# Patient Record
Sex: FEMALE | Race: WHITE | Employment: PART TIME | URBAN - METROPOLITAN AREA
[De-identification: names, ages, dates, MRNs, and addresses within clinical notes are randomized per-mention and may not be internally consistent; named-entity substitution may affect disease eponyms.]

---

## 2018-03-15 ENCOUNTER — HOSPITAL ENCOUNTER (OUTPATIENT)
Dept: MRI IMAGING | Age: 68
Discharge: HOME | End: 2018-03-17
Payer: MEDICARE

## 2018-03-15 ENCOUNTER — HOSPITAL ENCOUNTER (OUTPATIENT)
Age: 68
Discharge: HOME | End: 2018-03-15
Payer: MEDICARE

## 2018-03-15 DIAGNOSIS — R52 PAIN: ICD-10-CM

## 2018-03-15 DIAGNOSIS — M54.12 RADICULOPATHY OF CERVICAL SPINE: ICD-10-CM

## 2018-03-15 LAB
BUN BLDV-MCNC: 22 MG/DL (ref 8–23)
CREAT SERPL-MCNC: 0.9 MG/DL (ref 0.5–1)
GFR AFRICAN AMERICAN: >60
GFR NON-AFRICAN AMERICAN: >60 ML/MIN/1.73

## 2018-03-15 PROCEDURE — 6360000004 HC RX CONTRAST MEDICATION: Performed by: RADIOLOGY

## 2018-03-15 PROCEDURE — 72156 MRI NECK SPINE W/O & W/DYE: CPT

## 2018-03-15 PROCEDURE — 84520 ASSAY OF UREA NITROGEN: CPT

## 2018-03-15 PROCEDURE — 36415 COLL VENOUS BLD VENIPUNCTURE: CPT

## 2018-03-15 PROCEDURE — 82565 ASSAY OF CREATININE: CPT

## 2018-03-15 PROCEDURE — A9579 GAD-BASE MR CONTRAST NOS,1ML: HCPCS | Performed by: RADIOLOGY

## 2018-03-15 RX ADMIN — GADOTERIDOL 16 ML: 279.3 INJECTION, SOLUTION INTRAVENOUS at 15:42

## 2018-04-30 ENCOUNTER — OFFICE VISIT (OUTPATIENT)
Dept: NEUROSURGERY | Age: 68
End: 2018-04-30
Payer: MEDICARE

## 2018-04-30 VITALS
WEIGHT: 180 LBS | SYSTOLIC BLOOD PRESSURE: 139 MMHG | HEART RATE: 87 BPM | HEIGHT: 64 IN | DIASTOLIC BLOOD PRESSURE: 86 MMHG | BODY MASS INDEX: 30.73 KG/M2

## 2018-04-30 DIAGNOSIS — M54.2 NECK PAIN: Primary | ICD-10-CM

## 2018-04-30 PROCEDURE — 3017F COLORECTAL CA SCREEN DOC REV: CPT | Performed by: NEUROLOGICAL SURGERY

## 2018-04-30 PROCEDURE — G8427 DOCREV CUR MEDS BY ELIG CLIN: HCPCS | Performed by: NEUROLOGICAL SURGERY

## 2018-04-30 PROCEDURE — 99204 OFFICE O/P NEW MOD 45 MIN: CPT | Performed by: NEUROLOGICAL SURGERY

## 2018-04-30 PROCEDURE — 1090F PRES/ABSN URINE INCON ASSESS: CPT | Performed by: NEUROLOGICAL SURGERY

## 2018-04-30 PROCEDURE — G8417 CALC BMI ABV UP PARAM F/U: HCPCS | Performed by: NEUROLOGICAL SURGERY

## 2018-04-30 RX ORDER — DICYCLOMINE HYDROCHLORIDE 10 MG/1
10 CAPSULE ORAL
COMMUNITY

## 2018-04-30 RX ORDER — GRAPE SEED EXT/BIOFLAV,CITRUS 50MG-250MG
CAPSULE ORAL
COMMUNITY
End: 2021-02-09 | Stop reason: ALTCHOICE

## 2018-04-30 ASSESSMENT — ENCOUNTER SYMPTOMS
NAUSEA: 0
PHOTOPHOBIA: 0
COUGH: 0
SHORTNESS OF BREATH: 0
BLURRED VISION: 0
VOMITING: 0
STRIDOR: 0

## 2018-06-07 ENCOUNTER — HOSPITAL ENCOUNTER (OUTPATIENT)
Age: 68
Discharge: HOME | End: 2018-06-07
Payer: MEDICARE

## 2018-06-07 DIAGNOSIS — M46.22 OSTEOMYELITIS OF VERTEBRA OF CERVICAL REGION (HCC): ICD-10-CM

## 2018-06-07 LAB
C-REACTIVE PROTEIN: 0.2 MG/DL (ref 0–0.4)
SEDIMENTATION RATE, ERYTHROCYTE: 10 MM/HR (ref 0–20)

## 2018-06-07 PROCEDURE — 86140 C-REACTIVE PROTEIN: CPT

## 2018-06-07 PROCEDURE — 36415 COLL VENOUS BLD VENIPUNCTURE: CPT

## 2018-06-07 PROCEDURE — 87040 BLOOD CULTURE FOR BACTERIA: CPT

## 2018-06-07 PROCEDURE — 85651 RBC SED RATE NONAUTOMATED: CPT

## 2018-06-12 LAB — BLOOD CULTURE, ROUTINE: NORMAL

## 2018-08-21 ENCOUNTER — OFFICE VISIT (OUTPATIENT)
Dept: CARDIOLOGY CLINIC | Age: 68
End: 2018-08-21
Payer: MEDICARE

## 2018-08-21 VITALS
RESPIRATION RATE: 18 BRPM | BODY MASS INDEX: 31.67 KG/M2 | WEIGHT: 190.1 LBS | HEART RATE: 85 BPM | DIASTOLIC BLOOD PRESSURE: 88 MMHG | HEIGHT: 65 IN | SYSTOLIC BLOOD PRESSURE: 132 MMHG

## 2018-08-21 DIAGNOSIS — I10 ESSENTIAL HYPERTENSION: ICD-10-CM

## 2018-08-21 DIAGNOSIS — I25.10 CORONARY ARTERY DISEASE INVOLVING NATIVE CORONARY ARTERY OF NATIVE HEART WITHOUT ANGINA PECTORIS: Primary | ICD-10-CM

## 2018-08-21 DIAGNOSIS — E78.2 MIXED HYPERLIPIDEMIA: ICD-10-CM

## 2018-08-21 DIAGNOSIS — I38 VALVULAR HEART DISEASE: ICD-10-CM

## 2018-08-21 PROCEDURE — 1101F PT FALLS ASSESS-DOCD LE1/YR: CPT | Performed by: INTERNAL MEDICINE

## 2018-08-21 PROCEDURE — 1036F TOBACCO NON-USER: CPT | Performed by: INTERNAL MEDICINE

## 2018-08-21 PROCEDURE — G8417 CALC BMI ABV UP PARAM F/U: HCPCS | Performed by: INTERNAL MEDICINE

## 2018-08-21 PROCEDURE — G8400 PT W/DXA NO RESULTS DOC: HCPCS | Performed by: INTERNAL MEDICINE

## 2018-08-21 PROCEDURE — 4040F PNEUMOC VAC/ADMIN/RCVD: CPT | Performed by: INTERNAL MEDICINE

## 2018-08-21 PROCEDURE — 1123F ACP DISCUSS/DSCN MKR DOCD: CPT | Performed by: INTERNAL MEDICINE

## 2018-08-21 PROCEDURE — 1090F PRES/ABSN URINE INCON ASSESS: CPT | Performed by: INTERNAL MEDICINE

## 2018-08-21 PROCEDURE — G8598 ASA/ANTIPLAT THER USED: HCPCS | Performed by: INTERNAL MEDICINE

## 2018-08-21 PROCEDURE — G8427 DOCREV CUR MEDS BY ELIG CLIN: HCPCS | Performed by: INTERNAL MEDICINE

## 2018-08-21 PROCEDURE — 93000 ELECTROCARDIOGRAM COMPLETE: CPT | Performed by: INTERNAL MEDICINE

## 2018-08-21 PROCEDURE — 99213 OFFICE O/P EST LOW 20 MIN: CPT | Performed by: INTERNAL MEDICINE

## 2018-08-21 PROCEDURE — 3017F COLORECTAL CA SCREEN DOC REV: CPT | Performed by: INTERNAL MEDICINE

## 2018-08-21 NOTE — PROGRESS NOTES
Al Hyd-Mg Hyd]        Current Medications:  Current Outpatient Prescriptions   Medication Sig Dispense Refill    LYRICA 100 MG capsule TK ONE C PO  BID  1    Black Cohosh 540 MG CAPS Take by mouth      Omega-3 Fatty Acids (FISH OIL) 1200 MG CPDR Take by mouth      dicyclomine (BENTYL) 10 MG capsule Take 10 mg by mouth 4 times daily (before meals and nightly)      magnesium oxide (MAG-OX) 400 MG tablet Take 400 mg by mouth daily      venlafaxine (EFFEXOR) 75 MG tablet Take 75 mg by mouth daily       dexlansoprazole (DEXILANT) 60 MG CPDR delayed release capsule Take 60 mg by mouth daily      cetirizine (ZYRTEC) 10 MG tablet Take 10 mg by mouth daily      fluticasone (FLONASE) 50 MCG/ACT nasal spray 1 spray by Nasal route daily      ZETIA 10 MG tablet   3    clopidogrel (PLAVIX) 75 MG tablet Take 75 mg by mouth daily.  lisinopril (PRINIVIL;ZESTRIL) 10 MG tablet Take 10 mg by mouth daily.  hydrocortisone 2.5 % cream Apply  topically 2 times daily. Apply topically 2 times daily.  albuterol (PROAIR HFA) 108 (90 BASE) MCG/ACT inhaler Inhale 2 puffs into the lungs every 6 hours as needed for Wheezing.  zolpidem (AMBIEN) 10 MG tablet Take 10 mg by mouth nightly as needed for Sleep.  ALPRAZolam (XANAX) 0.5 MG tablet Take 0.5 mg by mouth 2 times daily as needed for Sleep or Anxiety.  montelukast (SINGULAIR) 10 MG tablet Take 10 mg by mouth nightly.  Coenzyme Q10 (CO Q-10) 200 MG CAPS Take by mouth      methocarbamol (ROBAXIN) 500 MG tablet Take 500 mg by mouth 4 times daily      budesonide-formoterol (SYMBICORT) 160-4.5 MCG/ACT AERO Inhale 2 puffs into the lungs 2 times daily      acyclovir (ZOVIRAX) 5 % CREA Apply topically 3 times daily      estradiol 0.025 MG/24HR PTTW Place 1 patch onto the skin Twice a Week.  clobetasol (TEMOVATE) 0.05 % cream Apply  topically 2 times daily. Apply topically 2 times daily.       nystatin (MYCOSTATIN) 369794 UNIT/GM cream Apply topically 2 times daily. Apply topically 2 times daily.  diclofenac (FLECTOR) 1.3 % patch Place 1 patch onto the skin 2 times daily. No current facility-administered medications for this visit. Physical Exam:  /88   Pulse 85   Resp 18   Ht 5' 5\" (1.651 m)   Wt 190 lb 1.6 oz (86.2 kg)   BMI 31.63 kg/m²   Wt Readings from Last 3 Encounters:   08/21/18 190 lb 1.6 oz (86.2 kg)   06/06/18 184 lb (83.5 kg)   04/30/18 180 lb (81.6 kg)     Appearance: Awake, alert and oriented x 3, no acute respiratory distress  Skin: Intact, no erythema, no rash  Head: Normocephalic, atraumatic  Eyes: EOMI, no conjunctival erythema  ENMT: No pharyngeal erythema, MMM, no rhinorrhea, no dentures  Neck: Supple, no elevated JVP, no carotid bruits  Lungs: Clear to auscultation bilaterally. No wheezes, rales, or rhonchi. Cardiac: Regular rate and rhythm, +S1S2, no murmurs apparent  Abdomen: Soft, nontender, non-distended, +bowel sounds  Extremities: Moves all extremities x 4, no lower extremity edema  Neurologic: No focal motor deficits apparent, normal mood and affect, alert and oriented x 3    Laboratory Tests:  Lab Results   Component Value Date    CREATININE 0.9 03/15/2018    BUN 22 03/15/2018     09/19/2014    K 3.9 09/19/2014     (H) 09/19/2014    CO2 19 (L) 09/19/2014     Lab Results   Component Value Date    WBC 4.0 09/19/2014    RBC 2.84 09/19/2014    HGB 9.1 09/19/2014    HCT 26.8 09/19/2014    MCV 94.4 09/19/2014    MCH 32.1 09/19/2014    MCHC 34.0 09/19/2014    RDW 12.8 09/19/2014     09/19/2014    MPV 8.0 09/19/2014     Lab Results   Component Value Date    MG 2.1 09/19/2014     Cardiac Tests:  ECG: SR, rate 85, NSSTT changes    ASSESSMENT / PLAN:  1. CAD s/p 5V CABG in 2001 with subsequent PCI ~ 6 months later. Negative stress test in 2007.  Small mild reversible defect at the basal inferior wall on 3/2014 stress test (medically managed, normal LV function with EF 60% on echocardiogram at that time). Previously followed by Dr. Camila Riojas. No new cardiac complaints. 2. HTN - typically controlled, most recent 's-130's  3. HLD - on zetia, reported statin intolerance (multiple statins)  4. Chronic back pain  5.  Mild MR and mild TR on 3/2014 echocardiogram  - 2/2015 labs: K 4.3, Cr 0.87, normal LFT's, chol 202, trig 70, LDL 92, HDL 86  - 5/2017 labs: K 4.4, Cr 0.87, normal LFT's, chol 292, trig 232, , HDL 60    - Continue current medications (including plavix, zetia, and lisinopril)  - Follow-up results of repeat lipid panel (recently performed) / discussed option of repatha (not covered per patient)    Monika Dallas MD  [facility] [place] ([facility]) Cardiology

## 2019-02-14 ENCOUNTER — HOSPITAL ENCOUNTER (OUTPATIENT)
Age: 69
Discharge: HOME | End: 2019-02-14
Payer: MEDICARE

## 2019-02-14 ENCOUNTER — HOSPITAL ENCOUNTER (OUTPATIENT)
Dept: CT IMAGING | Age: 69
Discharge: HOME | End: 2019-02-16
Payer: MEDICARE

## 2019-02-14 ENCOUNTER — HOSPITAL ENCOUNTER (OUTPATIENT)
Age: 69
Discharge: HOME | End: 2019-02-16
Payer: MEDICARE

## 2019-02-14 DIAGNOSIS — R10.84 ABDOMINAL PAIN, GENERALIZED: ICD-10-CM

## 2019-02-14 DIAGNOSIS — R10.32 ABDOMINAL PAIN, LEFT LOWER QUADRANT: ICD-10-CM

## 2019-02-14 LAB
ALBUMIN SERPL-MCNC: 4.5 G/DL (ref 3.5–5.2)
ALP BLD-CCNC: 181 U/L (ref 35–104)
ALT SERPL-CCNC: 83 U/L (ref 0–32)
AMYLASE: 49 U/L (ref 20–100)
ANION GAP SERPL CALCULATED.3IONS-SCNC: 15 MMOL/L (ref 7–16)
AST SERPL-CCNC: 64 U/L (ref 0–31)
BACTERIA: ABNORMAL /HPF
BASOPHILS ABSOLUTE: 0.04 E9/L (ref 0–0.2)
BASOPHILS RELATIVE PERCENT: 0.4 % (ref 0–2)
BILIRUB SERPL-MCNC: 0.5 MG/DL (ref 0–1.2)
BILIRUBIN URINE: NEGATIVE
BLOOD, URINE: ABNORMAL
BUN BLDV-MCNC: 10 MG/DL (ref 8–23)
CALCIUM SERPL-MCNC: 10.4 MG/DL (ref 8.6–10.2)
CHLORIDE BLD-SCNC: 99 MMOL/L (ref 98–107)
CLARITY: CLEAR
CO2: 26 MMOL/L (ref 22–29)
COLOR: YELLOW
CREAT SERPL-MCNC: 1 MG/DL (ref 0.5–1)
EOSINOPHILS ABSOLUTE: 0.16 E9/L (ref 0.05–0.5)
EOSINOPHILS RELATIVE PERCENT: 1.6 % (ref 0–6)
GFR AFRICAN AMERICAN: >60
GFR NON-AFRICAN AMERICAN: 55 ML/MIN/1.73
GLUCOSE BLD-MCNC: 103 MG/DL (ref 74–99)
GLUCOSE URINE: NEGATIVE MG/DL
HCT VFR BLD CALC: 40.6 % (ref 34–48)
HEMOGLOBIN: 13 G/DL (ref 11.5–15.5)
IMMATURE GRANULOCYTES #: 0.06 E9/L
IMMATURE GRANULOCYTES %: 0.6 % (ref 0–5)
KETONES, URINE: NEGATIVE MG/DL
LEUKOCYTE ESTERASE, URINE: NEGATIVE
LIPASE: 55 U/L (ref 13–60)
LYMPHOCYTES ABSOLUTE: 1.72 E9/L (ref 1.5–4)
LYMPHOCYTES RELATIVE PERCENT: 17.6 % (ref 20–42)
MCH RBC QN AUTO: 31.4 PG (ref 26–35)
MCHC RBC AUTO-ENTMCNC: 32 % (ref 32–34.5)
MCV RBC AUTO: 98.1 FL (ref 80–99.9)
MONOCYTES ABSOLUTE: 0.67 E9/L (ref 0.1–0.95)
MONOCYTES RELATIVE PERCENT: 6.8 % (ref 2–12)
NEUTROPHILS ABSOLUTE: 7.14 E9/L (ref 1.8–7.3)
NEUTROPHILS RELATIVE PERCENT: 73 % (ref 43–80)
NITRITE, URINE: NEGATIVE
PDW BLD-RTO: 12.7 FL (ref 11.5–15)
PH UA: 6 (ref 5–9)
PLATELET # BLD: 318 E9/L (ref 130–450)
PMV BLD AUTO: 8.9 FL (ref 7–12)
POTASSIUM SERPL-SCNC: 4 MMOL/L (ref 3.5–5)
PROTEIN UA: NEGATIVE MG/DL
RBC # BLD: 4.14 E12/L (ref 3.5–5.5)
RBC UA: ABNORMAL /HPF (ref 0–2)
SODIUM BLD-SCNC: 140 MMOL/L (ref 132–146)
SPECIFIC GRAVITY UA: <=1.005 (ref 1–1.03)
TOTAL PROTEIN: 7.9 G/DL (ref 6.4–8.3)
UROBILINOGEN, URINE: 0.2 E.U./DL
WBC # BLD: 9.8 E9/L (ref 4.5–11.5)
WBC UA: ABNORMAL /HPF (ref 0–5)

## 2019-02-14 PROCEDURE — 6360000004 HC RX CONTRAST MEDICATION: Performed by: PHYSICIAN ASSISTANT

## 2019-02-14 PROCEDURE — 2580000003 HC RX 258: Performed by: NURSE PRACTITIONER

## 2019-02-14 PROCEDURE — 82150 ASSAY OF AMYLASE: CPT

## 2019-02-14 PROCEDURE — 81001 URINALYSIS AUTO W/SCOPE: CPT

## 2019-02-14 PROCEDURE — 87088 URINE BACTERIA CULTURE: CPT

## 2019-02-14 PROCEDURE — 36415 COLL VENOUS BLD VENIPUNCTURE: CPT

## 2019-02-14 PROCEDURE — 83690 ASSAY OF LIPASE: CPT

## 2019-02-14 PROCEDURE — 80053 COMPREHEN METABOLIC PANEL: CPT

## 2019-02-14 PROCEDURE — 85025 COMPLETE CBC W/AUTO DIFF WBC: CPT

## 2019-02-14 PROCEDURE — 74177 CT ABD & PELVIS W/CONTRAST: CPT

## 2019-02-14 RX ORDER — SODIUM CHLORIDE 0.9 % (FLUSH) 0.9 %
10 SYRINGE (ML) INJECTION PRN
Status: DISCONTINUED | OUTPATIENT
Start: 2019-02-14 | End: 2019-02-17 | Stop reason: HOSPADM

## 2019-02-14 RX ADMIN — IOHEXOL 50 ML: 240 INJECTION, SOLUTION INTRATHECAL; INTRAVASCULAR; INTRAVENOUS; ORAL at 15:14

## 2019-02-14 RX ADMIN — IOPAMIDOL 100 ML: 755 INJECTION, SOLUTION INTRAVENOUS at 15:14

## 2019-02-14 RX ADMIN — Medication 10 ML: at 15:14

## 2019-02-15 LAB — URINE CULTURE, ROUTINE: NORMAL

## 2019-04-10 ENCOUNTER — HOSPITAL ENCOUNTER (OUTPATIENT)
Age: 69
Discharge: HOME | End: 2019-04-10
Payer: MEDICARE

## 2019-04-10 LAB
ALBUMIN SERPL-MCNC: 4.3 G/DL (ref 3.5–5.2)
ALP BLD-CCNC: 103 U/L (ref 35–104)
ALT SERPL-CCNC: 31 U/L (ref 0–32)
AMYLASE: 42 U/L (ref 20–100)
ANION GAP SERPL CALCULATED.3IONS-SCNC: 13 MMOL/L (ref 7–16)
AST SERPL-CCNC: 30 U/L (ref 0–31)
BASOPHILS ABSOLUTE: 0.02 E9/L (ref 0–0.2)
BASOPHILS RELATIVE PERCENT: 0.3 % (ref 0–2)
BILIRUB SERPL-MCNC: 0.4 MG/DL (ref 0–1.2)
BUN BLDV-MCNC: 10 MG/DL (ref 8–23)
CALCIUM SERPL-MCNC: 9.6 MG/DL (ref 8.6–10.2)
CHLORIDE BLD-SCNC: 101 MMOL/L (ref 98–107)
CO2: 26 MMOL/L (ref 22–29)
CREAT SERPL-MCNC: 1 MG/DL (ref 0.5–1)
EOSINOPHILS ABSOLUTE: 0.17 E9/L (ref 0.05–0.5)
EOSINOPHILS RELATIVE PERCENT: 2.6 % (ref 0–6)
GFR AFRICAN AMERICAN: >60
GFR NON-AFRICAN AMERICAN: 55 ML/MIN/1.73
GLUCOSE BLD-MCNC: 98 MG/DL (ref 74–99)
HCT VFR BLD CALC: 41.9 % (ref 34–48)
HEMOGLOBIN: 13.6 G/DL (ref 11.5–15.5)
IMMATURE GRANULOCYTES #: 0.02 E9/L
IMMATURE GRANULOCYTES %: 0.3 % (ref 0–5)
LYMPHOCYTES ABSOLUTE: 1.9 E9/L (ref 1.5–4)
LYMPHOCYTES RELATIVE PERCENT: 29.5 % (ref 20–42)
MCH RBC QN AUTO: 31.1 PG (ref 26–35)
MCHC RBC AUTO-ENTMCNC: 32.5 % (ref 32–34.5)
MCV RBC AUTO: 95.7 FL (ref 80–99.9)
MONOCYTES ABSOLUTE: 0.5 E9/L (ref 0.1–0.95)
MONOCYTES RELATIVE PERCENT: 7.8 % (ref 2–12)
NEUTROPHILS ABSOLUTE: 3.84 E9/L (ref 1.8–7.3)
NEUTROPHILS RELATIVE PERCENT: 59.5 % (ref 43–80)
PDW BLD-RTO: 13 FL (ref 11.5–15)
PLATELET # BLD: 281 E9/L (ref 130–450)
PMV BLD AUTO: 9.3 FL (ref 7–12)
POTASSIUM SERPL-SCNC: 3.9 MMOL/L (ref 3.5–5)
RBC # BLD: 4.38 E12/L (ref 3.5–5.5)
SODIUM BLD-SCNC: 140 MMOL/L (ref 132–146)
TOTAL PROTEIN: 7.3 G/DL (ref 6.4–8.3)
WBC # BLD: 6.5 E9/L (ref 4.5–11.5)

## 2019-04-10 PROCEDURE — 80053 COMPREHEN METABOLIC PANEL: CPT

## 2019-04-10 PROCEDURE — 36415 COLL VENOUS BLD VENIPUNCTURE: CPT

## 2019-04-10 PROCEDURE — 82150 ASSAY OF AMYLASE: CPT

## 2019-04-10 PROCEDURE — 85025 COMPLETE CBC W/AUTO DIFF WBC: CPT

## 2019-04-10 PROCEDURE — 80061 LIPID PANEL: CPT

## 2019-04-11 ENCOUNTER — HOSPITAL ENCOUNTER (OUTPATIENT)
Dept: CT IMAGING | Age: 69
Discharge: HOME | End: 2019-04-13
Payer: MEDICARE

## 2019-04-11 DIAGNOSIS — R10.30 LOWER ABDOMINAL PAIN: ICD-10-CM

## 2019-04-11 LAB
CHOLESTEROL, TOTAL: 245 MG/DL (ref 0–199)
HDLC SERPL-MCNC: 70 MG/DL
LDL CHOLESTEROL CALCULATED: 147 MG/DL (ref 0–99)
TRIGL SERPL-MCNC: 141 MG/DL (ref 0–149)
VLDLC SERPL CALC-MCNC: 28 MG/DL

## 2019-04-11 PROCEDURE — 2580000003 HC RX 258: Performed by: INTERNAL MEDICINE

## 2019-04-11 PROCEDURE — 74177 CT ABD & PELVIS W/CONTRAST: CPT

## 2019-04-11 PROCEDURE — 6360000004 HC RX CONTRAST MEDICATION: Performed by: RADIOLOGY

## 2019-04-11 RX ORDER — SODIUM CHLORIDE 0.9 % (FLUSH) 0.9 %
10 SYRINGE (ML) INJECTION PRN
Status: DISCONTINUED | OUTPATIENT
Start: 2019-04-11 | End: 2019-04-14 | Stop reason: HOSPADM

## 2019-04-11 RX ADMIN — Medication 10 ML: at 09:07

## 2019-04-11 RX ADMIN — IOPAMIDOL 100 ML: 755 INJECTION, SOLUTION INTRAVENOUS at 09:06

## 2019-04-16 ENCOUNTER — HOSPITAL ENCOUNTER (OUTPATIENT)
Dept: PHYSICAL THERAPY | Age: 69
Setting detail: THERAPIES SERIES
Discharge: HOME | End: 2019-04-16
Payer: MEDICARE

## 2019-04-16 PROCEDURE — 97112 NEUROMUSCULAR REEDUCATION: CPT

## 2019-04-16 PROCEDURE — 97161 PT EVAL LOW COMPLEX 20 MIN: CPT

## 2019-04-16 PROCEDURE — 97140 MANUAL THERAPY 1/> REGIONS: CPT

## 2019-04-16 NOTE — PROGRESS NOTES
[address] [facility]                Phone: [phone]   Fax: [phone]    Physical Therapy Initial Evaluation  [facility]) Physical Therapy Initial Evaluation    Date of Eval: 2019   Today's Date: 2019  Patient Name: Stanley Daniel  MRN: [id number]  : 1950     Current Visit Number: 1 of 8 visits per current plan of care  Therapist Name: Murali Shah, PT, DPT  Insurance Type: medicate  ICD-10/Diagnosis: muscle weakness  Precautions: hysterectomy and oophrectomy in her 35s   Referring Provider: Dr. Elizabeth Quick of Care:   Certification Date: 45-    SUBJECTIVE:  Pt is a(n) 68 y.o. y/o female presenting with a chief complaint of TERRA. Also has diverticulitis currently being treated.  Has had for about 1 year, here to learn exercises to avoid problem getting worse    Symptom Duration: 1 year  Previous Treatment/Testing/Surgeries:  Hysterectomy and oophrectomy >15 years ago  Relevant Medical History/LBP:  Arthritic LBP  Medications: plavix, abx for diverticulitis, ptomac for stomach, zetia (diuretic  Work Status: works 2 days per week at  and [employer] for children      Functional Limitations: worried about leaking, pad use, cant lift children without leaking/fearing leaking    Pain: no pain issues      Pregnancy:   Number of pregnancies: 4  Number of births: 3  Type of delivery: natural  Pelvic trauma, tearing, pain during/post pregnancy: episiotomy, 10.4# baby   Prolonged labor: no  Menopausal/Pre-menopausal: post--surgical menopause      Bladder:   Daytime voiding frequency: every 20 minutes -- 2-3 hours   Nighttime voiding frequency: 0-1x  Urinary urgency/leaking with urge: no  Leaking with cough/sneeze/laugh/exercise: yes  Amount of incontinence: small to medium amounts   Pain/burning with urination: no    Pain with full bladder: no  Difficulty starting stream/hesitancy: no  Straining: no  Sensation of incomplete bladder emptying: no  Post Pelvic Floor Connective Tissue/Muscle Assessment: assessed externally and vaginally   Introitus: normal, some scarring noted ~6oclock   Bulbospongiosis: min tihgt nil tender   Ischiocavernosus: ok    Periurethrals: normal  Perineal body: hypermobile  Pubococcygeus: nil tight and tender   Iliococcygeus: ok  Coccygeus: ok  Obturator Internus: min tight and tender B    Rectal:  External Anal Sphincter: NT  Puborectalis: NT     Pelvic Floor Internal Strength Assessment:   Power: 3/5   Endurance: 3s  Repetitions: 8 (MVC with 4 seconds rest between each contraction until fatigue)     TREATMENT:     TREATMENT INTERVENTIONS:   Manual Therapy (41753): Internal and/or external connective tissue and muscular tightness/restrictions identified above were treated with skin rolling, trigger point release, reciprocal inhibition, myofascial release, and/or contract/relax as appropriate. Neuromuscular Re-Education (67758): Pt was educated via verbal and tactile feedback on appropriate abdominal, diaphragm, and pelvic floor neuromuscular coordination and mobility necessary to address chief complaint. Pt was educated and instructed in appropriate diaphragmatic breathing   Pt was educated on pelvic floor relaxation and pelvic/body awareness   Functional progression- TA/PF with exhale, FFF, HAHA, [facility]  Patient Education:   Pt educated re: anatomy and physiology of chief complaint   Water intake  Bladder irritants/Sandwiching irritants   Bladder diary  Squatty potty/elevation of lower extremities during elimination   Defecation dynamics  Lubricant use and recommendations    HEP: bladder diary, PF/TA co-contractions, PF/TA functional progression    Post treatment pain/symptoms: non    Patient verbalized understanding of education: yes  ? PLAN OF CARE:   Assessment: Pt presents with >1 year TERRA.  Pt demonstrates decreased coordination with core and IAP, increased pad usage, PF laxity paired with some minimal restrictions in mm/CT, and potentially suboptimal fluid intake spacing which are likely contributing to patients chief complaint and causing decreased QOL. . This patient may benefit from skilled therapy services to address these impairments and limitations and meet pt and PT goals and improve pts quality of life. Prognosis is good    Next visit: assess bladder diary      Goals for Episode of Care: created on 4/16/19  To be achieved in 8 visits   Pt will verbalize an understanding of the anatomy and physiology of the problem as well as causes of incontinence related to the pt's presentation. Pt will demonstrate appropriate isolation of PFM including appropriate contraction and relaxation. Pt will be independent in and compliant with performance of a home exercise program provided by therapist and perform as instructed. Pt will report/demonstrate a reduction of incontinence/leakage episodes by at least 50%. Pt will report decreased pad usage by at least 50%. Pt will demonstrate use of functional pelvic floor muscle contraction by performing a pre-contraction (KNACK) to reduce/eliminate stress incontinence during cough/sneeze/laugh/lifting/jumping, etc.   Pt will report decreased urinary frequency/increased time between voids to every 2-5 hours. ?  Planned Interventions, Frequency, and Duration: 1 visit every 2-3 weeks for 2 visits then reassess. ? Patient demonstrates good understanding of plan of care and treatment. The above goals and plan of care were discussed and agreed upon by patient/family. Billing:   Time in: 1302  Time out: 1400  Total Time: 58 minutes     Dheeraj Reardon, [address] [place] [address], [facility]  License [id number]     Katy Villegas  T: [phone]   F: [phone]     If you have any questions or concerns, please don't hesitate to call.   Thank you for your referral.    Physician Signature:________________________________Date:__________________  By signing above, therapists plan is approved by physician. All patients under [employer] must be signed by physician.

## 2019-04-25 ENCOUNTER — HOSPITAL ENCOUNTER (OUTPATIENT)
Dept: PHYSICAL THERAPY | Age: 69
Setting detail: THERAPIES SERIES
Discharge: HOME | End: 2019-04-25
Payer: MEDICARE

## 2019-04-25 PROCEDURE — 97112 NEUROMUSCULAR REEDUCATION: CPT

## 2019-04-25 NOTE — PROGRESS NOTES
[address] [facility]                Phone: [phone]   Fax: [phone]    Physical Therapy Treatment Note  [facility] ([facility]) Physical Therapy    Date of Eval: 2019   Today's Date: 2019  Patient Name: Kassandra Cherry  MRN: [id number]  : 1950     Current Visit Number: 2 of 8 visits per current plan of care  Therapist Name: Zunilda Daly, PT, DPT  Insurance Type: medicare   ICD-10/Diagnosis: muscle weakness   Precautions: hysterectomy and oophrectomy in her 35s   Referring Provider: Dr. Farida Werner of Care:   Certification Date: 2914--3/49/8908  ? Patient identified by name and birth date: Yes    SUBJECTIVE: did homework, did knack, overall better. Less leaks.      Pain: no pain issues      Pregnancy: ()  Number of pregnancies: 4  Number of births: 3  Type of delivery: natural  Pelvic trauma, tearing, pain during/post pregnancy: episiotomy, 10.4# baby   Prolonged labor: no  Menopausal/Pre-menopausal: post--surgical menopause      Bladder: ()  Daytime voiding frequency: every 20 minutes -- 2-3 hours   Nighttime voiding frequency: 0-1x  Urinary urgency/leaking with urge: no  Leaking with cough/sneeze/laugh/exercise: yes  Amount of incontinence: small to medium amounts   Pain/burning with urination: no    Pain with full bladder: no  Difficulty starting stream/hesitancy: no  Straining: no  Sensation of incomplete bladder emptying: no  Post void dribble:  nono  Falling out sensation or bulging: no  Pad usage: 2 liners  History of UTIs: no  Fluid intake: 3L of water, 3 cups of coffee in AM      Bowel: ()  Frequency:  2-3x per day   Type: 6-7 lately  Fecal urgency/Leaking with urge: no   Leaking with cough/sneeze/laugh/exercise: no  Able to control gas: yes   Leaking with passing gas: no  Seepage after having bowel movement: no  Pain or burning with defecation: no  Straining to start/finish emptying: no   Sensation of incomplete bowel emptying: yes  Manual techniques for emptying:  no  Falling out sensation or bulging: no  Supplemental fiber/probiotic use/laxatives/softeners/enema: laxative and stool softeners due to diverticulitis   Time spent on toilet: 5 minutes     Sexual Function: ()  Pain with penetration, pelvic examination: no  Use of lubricants/contraception: no   Pain with menstruation/cyclical pain: no  Pain with orgasm: no   Difficulty achieving orgasm: no  History of sexual abuse, body shaming, pelvic trauma:  No    OBJECTIVE MEASURES WITH LEVEL OF FUNCTION:  Sitting posture: good in sitting   Bladder diary:   # voids: 8-11  Time between voids: 30 min-6hours--30 min intervals in clusters of voids lasting 13-22s, 3-4x   Seconds voidins-25s      TREATMENT INTERVENTIONS:   Neuromuscular Re-Education (38927): Pt was educated on anatomy/physiology of chief complaint  Pt was educated and instructed in appropriate diaphragmatic breathing   Pt was educated on pelvic floor relaxation and pelvic/body awareness   Pt was educated on urgency and urge suppression strategies   Pt was educated on appropriate defecation/elimination dynamics to encourage normal and safe bowel/bladder emptying techniques   Patient Education:   Water intake  Bladder irritants/Sandwiching irritants   Fiber intake   Fiber/food diary   Squatty potty/elevation of lower extremities during elimination     HEP: bladder diary, PF/TA co-contractions, PF/TA functional progression  New today: urgency suppression, voiding habits/bladder emptying, fluid intake and spacing *    ? Post treatment pain/symptoms: none     Patient verbalized understanding of education: yes   ? ASSESSMENT: Progressing through HEP--doing fairly diligently. Noticing less leaks and dryer pads. Added in urgency suppression, timing voids, minimizing JIC voids, and increasing awareness of bladder / fluid intake schedule.  Current TE is still appropriately challenging--none new added today    Next visit: progress TE Billing:   Time in: 1215  Time out: 1245  Total Time: 30 minutes     Stacia Womack, DPT  License [id number]

## 2019-08-13 ENCOUNTER — HOSPITAL ENCOUNTER (OUTPATIENT)
Dept: PHYSICAL THERAPY | Age: 69
Setting detail: THERAPIES SERIES
Discharge: HOME | End: 2019-08-13
Payer: MEDICARE

## 2019-08-29 ENCOUNTER — OFFICE VISIT (OUTPATIENT)
Dept: CARDIOLOGY CLINIC | Age: 69
End: 2019-08-29
Payer: MEDICARE

## 2019-08-29 VITALS
HEIGHT: 65 IN | HEART RATE: 93 BPM | DIASTOLIC BLOOD PRESSURE: 88 MMHG | WEIGHT: 170.2 LBS | RESPIRATION RATE: 16 BRPM | BODY MASS INDEX: 28.36 KG/M2 | SYSTOLIC BLOOD PRESSURE: 130 MMHG

## 2019-08-29 DIAGNOSIS — I25.10 CORONARY ARTERY DISEASE INVOLVING NATIVE CORONARY ARTERY OF NATIVE HEART WITHOUT ANGINA PECTORIS: Primary | ICD-10-CM

## 2019-08-29 DIAGNOSIS — E78.2 MIXED HYPERLIPIDEMIA: ICD-10-CM

## 2019-08-29 DIAGNOSIS — I38 VALVULAR HEART DISEASE: ICD-10-CM

## 2019-08-29 DIAGNOSIS — I10 ESSENTIAL HYPERTENSION: ICD-10-CM

## 2019-08-29 DIAGNOSIS — R07.2 PRECORDIAL CHEST PAIN: ICD-10-CM

## 2019-08-29 PROCEDURE — G8419 CALC BMI OUT NRM PARAM NOF/U: HCPCS | Performed by: INTERNAL MEDICINE

## 2019-08-29 PROCEDURE — G8400 PT W/DXA NO RESULTS DOC: HCPCS | Performed by: INTERNAL MEDICINE

## 2019-08-29 PROCEDURE — 1123F ACP DISCUSS/DSCN MKR DOCD: CPT | Performed by: INTERNAL MEDICINE

## 2019-08-29 PROCEDURE — G8598 ASA/ANTIPLAT THER USED: HCPCS | Performed by: INTERNAL MEDICINE

## 2019-08-29 PROCEDURE — 3017F COLORECTAL CA SCREEN DOC REV: CPT | Performed by: INTERNAL MEDICINE

## 2019-08-29 PROCEDURE — 93000 ELECTROCARDIOGRAM COMPLETE: CPT | Performed by: INTERNAL MEDICINE

## 2019-08-29 PROCEDURE — 4040F PNEUMOC VAC/ADMIN/RCVD: CPT | Performed by: INTERNAL MEDICINE

## 2019-08-29 PROCEDURE — 1036F TOBACCO NON-USER: CPT | Performed by: INTERNAL MEDICINE

## 2019-08-29 PROCEDURE — G8427 DOCREV CUR MEDS BY ELIG CLIN: HCPCS | Performed by: INTERNAL MEDICINE

## 2019-08-29 PROCEDURE — 1090F PRES/ABSN URINE INCON ASSESS: CPT | Performed by: INTERNAL MEDICINE

## 2019-08-29 PROCEDURE — 99214 OFFICE O/P EST MOD 30 MIN: CPT | Performed by: INTERNAL MEDICINE

## 2019-08-29 RX ORDER — AMOXICILLIN 250 MG
1 CAPSULE ORAL DAILY
COMMUNITY
End: 2021-02-09 | Stop reason: ALTCHOICE

## 2019-08-29 RX ORDER — PANTOPRAZOLE SODIUM 40 MG/1
40 TABLET, DELAYED RELEASE ORAL DAILY
COMMUNITY

## 2019-08-29 RX ORDER — RANITIDINE 150 MG/1
150 TABLET ORAL 2 TIMES DAILY
COMMUNITY
End: 2021-02-09 | Stop reason: ALTCHOICE

## 2019-08-29 NOTE — PROGRESS NOTES
OFFICE FOLLOW-UP    Name: Radha Meeks     Age: 68 y.o. Primary Care Physician: Madalyn Langley DO    Date of Service: 8/29/2019     Chief Complaint: Follow-up for CAD    Interim History:  She denies exertional chest pain, worsening SOB, palpitations, syncope, PND, or orthopnea. +fatigue and intermittent left arm pain (occasionally with exertion) since last admission. Currently with no active cardiac complaints at rest. SR on EKG. Review of Systems:   Cardiac: As per HPI  General: No fever, chills  Pulmonary: As per HPI  HEENT: No visual disturbances, difficult swallowing  GI: No nausea, vomiting, abdominal pain  Endocrine: No thyroid disease or diabetes  Musculoskeletal: BOYLE x 4, +chronic back pain  Skin: Intact, no rashes  Neuro/Psych: No headache, dementia, seizures, or focal motor deficits    Past Medical History:  Past Medical History:   Diagnosis Date    Asthma     CAD (coronary artery disease)     Hypertension      Past Surgical History:  Past Surgical History:   Procedure Laterality Date    APPENDECTOMY      BREAST SURGERY      CORONARY ARTERY BYPASS GRAFT      HYSTERECTOMY      KNEE SURGERY       Family History:  Non-contributory    Social History:  Social History     Socioeconomic History    Marital status:      Spouse name: Not on file    Number of children: Not on file    Years of education: Not on file    Highest education level: Not on file   Occupational History    Not on file   Social Needs    Financial resource strain: Not on file    Food insecurity:     Worry: Not on file     Inability: Not on file    Transportation needs:     Medical: Not on file     Non-medical: Not on file   Tobacco Use    Smoking status: Never Smoker    Smokeless tobacco: Never Used   Substance and Sexual Activity    Alcohol use:  Yes     Alcohol/week: 3.0 standard drinks     Types: 3 Glasses of wine per week    Drug use: No    Sexual activity: Never   Lifestyle    Physical activity: tablet Take 10 mg by mouth nightly as needed for Sleep.  montelukast (SINGULAIR) 10 MG tablet Take 10 mg by mouth nightly.  LYRICA 100 MG capsule TK ONE C PO  BID  1    Black Cohosh 540 MG CAPS Take by mouth      dicyclomine (BENTYL) 10 MG capsule Take 10 mg by mouth 4 times daily (before meals and nightly)      Coenzyme Q10 (CO Q-10) 200 MG CAPS Take by mouth      magnesium oxide (MAG-OX) 400 MG tablet Take 400 mg by mouth daily      dexlansoprazole (DEXILANT) 60 MG CPDR delayed release capsule Take 60 mg by mouth daily      methocarbamol (ROBAXIN) 500 MG tablet Take 500 mg by mouth 4 times daily      acyclovir (ZOVIRAX) 5 % CREA Apply topically 3 times daily      fluticasone (FLONASE) 50 MCG/ACT nasal spray 1 spray by Nasal route daily      estradiol 0.025 MG/24HR PTTW Place 1 patch onto the skin Twice a Week.  clobetasol (TEMOVATE) 0.05 % cream Apply  topically 2 times daily. Apply topically 2 times daily.  nystatin (MYCOSTATIN) [id number] UNIT/GM cream Apply  topically 2 times daily. Apply topically 2 times daily.  hydrocortisone 2.5 % cream Apply  topically 2 times daily. Apply topically 2 times daily.  diclofenac (FLECTOR) 1.3 % patch Place 1 patch onto the skin 2 times daily.  ALPRAZolam (XANAX) 0.5 MG tablet Take 0.5 mg by mouth 2 times daily as needed for Sleep or Anxiety. No current facility-administered medications for this visit.       Physical Exam:  /88   Pulse 93   Resp 16   Ht 5' 5\" (1.651 m)   Wt 170 lb 3.2 oz (77.2 kg)   BMI 28.32 kg/m²   Wt Readings from Last 3 Encounters:   08/29/19 170 lb 3.2 oz (77.2 kg)   08/21/18 190 lb 1.6 oz (86.2 kg)   06/06/18 184 lb (83.5 kg)     Appearance: Awake, alert and oriented x 3, no acute respiratory distress  Skin: Intact, no erythema, no rash  Head: Normocephalic, atraumatic  Eyes: EOMI, no conjunctival erythema  ENMT: No pharyngeal erythema, MMM, no rhinorrhea, no dentures  Neck: Supple, no elevated JVP, trig 232, , HDL 60  - 4/2019 labs: chol 245, trig 141, , HDL 70    - Exercise nuclear stress test ordered today  - Continue current medications (including plavix, zetia, and lisinopril)  - 4/2019 labs reviewed / discussed option of repatha (not covered per patient)    Antoine Dunlap MD  [facility] ([facility]) Cardiology

## 2019-09-10 ENCOUNTER — TELEPHONE (OUTPATIENT)
Dept: CARDIOLOGY | Age: 69
End: 2019-09-10

## 2019-09-12 ENCOUNTER — HOSPITAL ENCOUNTER (OUTPATIENT)
Dept: CARDIOLOGY | Age: 69
Discharge: HOME | End: 2019-09-12
Payer: MEDICARE

## 2019-09-12 VITALS
WEIGHT: 170 LBS | HEART RATE: 88 BPM | SYSTOLIC BLOOD PRESSURE: 140 MMHG | DIASTOLIC BLOOD PRESSURE: 82 MMHG | HEIGHT: 64 IN | BODY MASS INDEX: 29.02 KG/M2

## 2019-09-12 DIAGNOSIS — I25.10 CORONARY ARTERY DISEASE INVOLVING NATIVE CORONARY ARTERY OF NATIVE HEART WITHOUT ANGINA PECTORIS: ICD-10-CM

## 2019-09-12 DIAGNOSIS — R07.2 PRECORDIAL CHEST PAIN: ICD-10-CM

## 2019-09-12 LAB
LV EF: 91 %
LVEF MODALITY: NORMAL

## 2019-09-12 PROCEDURE — 2580000003 HC RX 258: Performed by: INTERNAL MEDICINE

## 2019-09-12 PROCEDURE — A9500 TC99M SESTAMIBI: HCPCS | Performed by: INTERNAL MEDICINE

## 2019-09-12 PROCEDURE — 93017 CV STRESS TEST TRACING ONLY: CPT

## 2019-09-12 PROCEDURE — 3430000000 HC RX DIAGNOSTIC RADIOPHARMACEUTICAL: Performed by: INTERNAL MEDICINE

## 2019-09-12 PROCEDURE — 78452 HT MUSCLE IMAGE SPECT MULT: CPT

## 2019-09-12 RX ORDER — SODIUM CHLORIDE 0.9 % (FLUSH) 0.9 %
10 SYRINGE (ML) INJECTION PRN
Status: DISCONTINUED | OUTPATIENT
Start: 2019-09-12 | End: 2019-09-13 | Stop reason: HOSPADM

## 2019-09-12 RX ADMIN — Medication 10 ML: at 08:49

## 2019-09-12 RX ADMIN — Medication 30.5 MILLICURIE: at 08:49

## 2019-09-12 RX ADMIN — Medication 10 ML: at 07:14

## 2019-09-12 RX ADMIN — Medication 9.7 MILLICURIE: at 07:14

## 2019-09-12 NOTE — PROCEDURES
[address] [facility] [address]  [phone]                Exercise Stress Nuclear Gated SPECT Study    Name: Stephany [facility] Account Number: [de-identified]    :  1950      Sex: female              Date of Study:  2019    Height: 5' 4\" (162.6 cm)  Weight: 170 lb (77.1 kg)     Ordering Provider: Magui Starks MD          PCP: Nkechi Schwarz DO      Cardiologist: Magui Starks MD                        Interpreting Physician: Orlando Mathias DO  _________________________________________________________________________________    Indication:   Evaluation of extent and severity of coronary artery disease    Clinical History:   Patient has prior history of coronary artery disease. Resting ECG:    Normal sinus rhythm    Exercise: The patient exercised using a Jefry protocol, completing 6:30 minutes and reaching an estimated work load of 7.0 metabolic equivalents (METS). Resting HR was 76. Peak exercise heart rate was 130 ( 87% of maximum predicted heart rate for age). Baseline /78. Peak exercise /100. The blood pressure response to exercise was hypertensive      Exercise was terminated due to heart rate attained, dyspnea. The patient experienced no chest pain with exercise. Exercise ECG:   The patient demonstrated occasional PVC's during exercise, and recovery. With exercise, there were no ST segment changes of significance at the heart rate achieved. Tracey treadmill score was 6.5 implying low risk. IMAGING: Myocardial perfusion imaging was performed at rest 30-35 minutes following the intravenous injection of 9.7 mCi of (Tc-Sestamibi) followed by 10 ml of Normal Saline. At peak exercise, the patient was injected intravenously with 30.5 mCi of (Tc-Sestamibi) followed by 10 ml of Normal Saline.   Gated post-stress tomographic imaging was performed 20-25 minutes after

## 2019-09-16 ENCOUNTER — TELEPHONE (OUTPATIENT)
Dept: CARDIOLOGY CLINIC | Age: 69
End: 2019-09-16

## 2020-05-22 ENCOUNTER — HOSPITAL ENCOUNTER (OUTPATIENT)
Age: 70
Discharge: HOME | End: 2020-05-24

## 2020-05-22 PROCEDURE — 88304 TISSUE EXAM BY PATHOLOGIST: CPT

## 2020-05-22 PROCEDURE — 88311 DECALCIFY TISSUE: CPT

## 2021-02-09 ENCOUNTER — OFFICE VISIT (OUTPATIENT)
Dept: ENT CLINIC | Age: 71
End: 2021-02-09
Payer: MEDICARE

## 2021-02-09 ENCOUNTER — TELEPHONE (OUTPATIENT)
Dept: ENT CLINIC | Age: 71
End: 2021-02-09

## 2021-02-09 ENCOUNTER — PROCEDURE VISIT (OUTPATIENT)
Dept: AUDIOLOGY | Age: 71
End: 2021-02-09
Payer: MEDICARE

## 2021-02-09 VITALS — HEIGHT: 65 IN | BODY MASS INDEX: 28.82 KG/M2 | WEIGHT: 173 LBS

## 2021-02-09 DIAGNOSIS — H91.8X3 OTHER SPECIFIED HEARING LOSS OF BOTH EARS: Primary | ICD-10-CM

## 2021-02-09 DIAGNOSIS — J30.89 SEASONAL ALLERGIC RHINITIS DUE TO OTHER ALLERGIC TRIGGER: ICD-10-CM

## 2021-02-09 DIAGNOSIS — H69.83 DYSFUNCTION OF BOTH EUSTACHIAN TUBES: ICD-10-CM

## 2021-02-09 DIAGNOSIS — J32.4 CHRONIC PANSINUSITIS: ICD-10-CM

## 2021-02-09 PROCEDURE — 92567 TYMPANOMETRY: CPT | Performed by: AUDIOLOGIST

## 2021-02-09 PROCEDURE — 99205 OFFICE O/P NEW HI 60 MIN: CPT | Performed by: OTOLARYNGOLOGY

## 2021-02-09 PROCEDURE — 92557 COMPREHENSIVE HEARING TEST: CPT | Performed by: AUDIOLOGIST

## 2021-02-09 RX ORDER — DOCUSATE SODIUM 100 MG/1
100 CAPSULE, LIQUID FILLED ORAL 2 TIMES DAILY
COMMUNITY

## 2021-02-09 RX ORDER — VENLAFAXINE HYDROCHLORIDE 75 MG/1
CAPSULE, EXTENDED RELEASE ORAL
COMMUNITY
Start: 2021-01-31

## 2021-02-09 RX ORDER — AZELASTINE 1 MG/ML
2 SPRAY, METERED NASAL 2 TIMES DAILY
Qty: 4 BOTTLE | Refills: 1 | Status: SHIPPED | OUTPATIENT
Start: 2021-02-09

## 2021-02-09 RX ORDER — FAMOTIDINE 20 MG/1
20 TABLET, FILM COATED ORAL 2 TIMES DAILY
COMMUNITY

## 2021-02-09 NOTE — TELEPHONE ENCOUNTER
Called patient with information on facility address and phone number .  Also gave scheduling number [phone] pt due to work schedule wants to schedule herself

## 2021-02-09 NOTE — PROGRESS NOTES
DEPARTMENT OF OTOLARYNGOLOGY   HISTORY AND PHYSICAL      PATIENT: Estefania Kaminski : 1950 (72 y.o.)   DATE: 2021  REFERRED BY: Sacha Turner DO  [address]      HISTORY OBTAINED FROM:  patient    CHIEF COMPLAINT:  had concerns including Sinus Problem (having trouble hearing; pcp thinks sinus related; get weekly immunotherapy for years; doesnt seem to be helping; alotof drainage). HISTORY OF PRESENT ILLNESS:                                                                                        Estefania Kaminski is a(n) 65 y.o. female without a significant past medical history presents to the office today as a new patient for evaluation of her hearing loss and allergic rhinitis and sinus infection. Had sinus surgery about 35 years ago. Has PND, globus sensation, facial pressure, \"swishing in the head\". Has about 4 episodes of sinus infection per year that require abx. Treated with Singulair, Zyrtec, Flonase w/o much improvement. She did not use Flonase daily. No imaging has been done recent. Currently on immunotherapy for 20 years. Also complains of hearing loss for several years. Talking loudly, has to have TV on loud volume to hear. Her children thinks she has trouble hearing. Has trouble hearing especially in crowded environment. Denies noise exposure. Denies room spinning vertigo. Denies tinnitus except for the \"swishing in the head. \"     Past Medical History:   Diagnosis Date    Asthma     CAD (coronary artery disease)     History of blood transfusion     Hyperlipidemia     Hypertension         Past Surgical History:   Procedure Laterality Date    APPENDECTOMY      BREAST SURGERY      COLONOSCOPY      CORONARY ARTERY BYPASS GRAFT      HYSTERECTOMY      KNEE SURGERY           Current Outpatient Medications:     venlafaxine (EFFEXOR XR) 75 MG extended release capsule, TAKE ONE CAPSULE BY MOUTH EVERY DAY, Disp: , Rfl:     famotidine (PEPCID) 20 MG tablet, Take 20 mg by mouth 2 times daily, Disp: , Rfl:     docusate sodium (COLACE) 100 MG capsule, Take 100 mg by mouth 2 times daily, Disp: , Rfl:     pantoprazole (PROTONIX) 40 MG tablet, Take 40 mg by mouth daily, Disp: , Rfl:     Omega-3 Fatty Acids (FISH OIL) 1200 MG CPDR, Take by mouth, Disp: , Rfl:     dicyclomine (BENTYL) 10 MG capsule, Take 10 mg by mouth 4 times daily (before meals and nightly), Disp: , Rfl:     cetirizine (ZYRTEC) 10 MG tablet, Take 10 mg by mouth daily, Disp: , Rfl:     ZETIA 10 MG tablet, , Disp: , Rfl: 3    clopidogrel (PLAVIX) 75 MG tablet, Take 75 mg by mouth daily. , Disp: , Rfl:     lisinopril (PRINIVIL;ZESTRIL) 10 MG tablet, Take 10 mg by mouth daily. , Disp: , Rfl:     albuterol (PROAIR HFA) 108 (90 BASE) MCG/ACT inhaler, Inhale 2 puffs into the lungs every 6 hours as needed for Wheezing., Disp: , Rfl:     zolpidem (AMBIEN) 10 MG tablet, Take 10 mg by mouth nightly as needed for Sleep., Disp: , Rfl:     montelukast (SINGULAIR) 10 MG tablet, Take 10 mg by mouth nightly., Disp: , Rfl:     Allergies   Allergen Reactions    Amoxicillin-Pot Clavulanate     Biaxin [Clarithromycin]     Keflex [Cephalexin]     Levofloxacin     Lipitor     Lovastatin     Macrodantin [Nitrofurantoin Macrocrystal]     Sulfa Antibiotics     Asa Arthritis Strength-Antacid [Aspirin Buff, Al Hyd-Mg Hyd]        Family History   Adopted: Yes   Problem Relation Age of Onset    Arthritis Mother        Social History     Socioeconomic History    Marital status:      Spouse name: Not on file    Number of children: Not on file    Years of education: Not on file    Highest education level: Not on file   Occupational History    Not on file   Social Needs    Financial resource strain: Not on file    Food insecurity     Worry: Not on file     Inability: Not on file    Transportation needs     Medical: Not on file     Non-medical: Not on file   Tobacco Use    Smoking status: Never Smoker    Smokeless tobacco: Never Used   Substance and Sexual Activity    Alcohol use: Yes     Alcohol/week: 3.0 standard drinks     Types: 3 Glasses of wine per week    Drug use: No    Sexual activity: Never   Lifestyle    Physical activity     Days per week: Not on file     Minutes per session: Not on file    Stress: Not on file   Relationships    Social connections     Talks on phone: Not on file     Gets together: Not on file     Attends [employer] service: Not on file     Active member of club or organization: Not on file     Attends meetings of clubs or organizations: Not on file     Relationship status: Not on file    Intimate partner violence     Fear of current or ex partner: Not on file     Emotionally abused: Not on file     Physically abused: Not on file     Forced sexual activity: Not on file   Other Topics Concern    Not on file   Social History Narrative    Not on file       REVIEW OF SYSTEMS:  Review of Systems  Constitutional: Negative for chills and fever. Eyes: Negative for discharge and itching. ENT: Negative for congestion, ear discharge, ear pain, hearing loss and sore throat. Respiratory: Negative for chest tightness and shortness of breath. Cardiovascular: Negative for chestpain and palpitations. Gastrointestinal: Negative for abdominal distention and abdominal pain. Endocrinology: Negative for heat and cold intolerance. Neurological: Negative for focal weaknessor seizure   Skin: Negative for rash and itchiness   Psychiatric: Negative for depression and hallucinations     PHYSICAL EXAM:                                                                                                                Ht 5' 4.5\" (1.638 m)   Wt 173 lb (78.5 kg)   BMI 29.24 kg/m²   Physical Exam  Constitutional: Appears well-developed and well-nourished. Appears as stated age.    Eyes: Lids and lashes normal, pupils equal, extra ocular muscles intact, sclera clear   ENT: Sinuses nontender on palpation, external ears and ear canals without lesions, left tympanic membrane tympanic membrane intact without effusion or masses, right tympanic membrane tympanic membrane intact without effusion or masses, nasal septal midline, bilateral inferior turbinates boggy, minimal rhinorrhea, lips normal, good dentition, gums normal, oral pharynx with moist mucus membranes, normal voice   Neck:  Supple, symmetrical, trachea midline, no adenopathy, thyroid symmetric, not enlarged and no tenderness, skin normal   Respiratory: No increased work of breathing. No stridor or wheezes   Cardiovascular: Regular rate   Skin: Warm and dry   Neurologic:  Alert and oriented, CN II-XII grossly intact     ASSESSMENTAND PLAN:                                                                                                  Diagnosis Orders   1. Chronic pansinusitis  CT SINUS WO CONTRAST   2. Seasonal allergic rhinitis due to other allergic trigger  CT SINUS WO CONTRAST   3. Other specified hearing loss of both ears  W[place] COMPREHENSIVE HEARING TEST       · Obtain CT sinus  · Hearing test reviewed with patient, has bilateral moderate-severe SNHL. Patient warranted to have hearing aids if desire   · Astelin prescribed  · Educated patient to use Flonase daily for maximal therapeutic benefit   · Follow up in 1 month(s) or sooner if symptoms acutely exacerbate    Patient was seen and examined with attending physician, who agrees with the assessment and plans. Marek Stockton DO  Resident Physician  [facility]  Otolaryngology Residency  2/9/2021  9:14 AM          Braulio Real  1950      I have discussed the case, including pertinent history and exam findings with the resident. I have seen and examined the patient and the key elements of the encounter have been performed by me. I agree with the assessment, plan and orders as documented by the resident. Patient here for follow up of medical problems. Remainder of medical problems as per resident note.       [facility], DO  3/3/21

## 2021-02-09 NOTE — PROGRESS NOTES
This patient was referred for audiometric/tympanometric testing by Dr. Micheline Gamino due to bilateral ear pressure and a significant decrease in hearing sensitivity, right ear worse. Patient denied tinnitus and vertigo, at this time. Audiometry revealed a mild-to-moderately-severe hearing loss, through the frequency range, bilaterally (right ear; worse). Reliability was fair. Speech reception thresholds were in good agreement with the pure tone averages, bilaterally. Speech discrimination scores were 88%, right ear and 96%, left ear at 75dBHL. Tympanometry revealed significant negative middle ear peak pressure and normal compliance, bilaterally. Ipsilateral acoustic reflexes were absent, right ear and present, left ear at 1000Hz. The results were reviewed with the patient. Recommendations for follow up will be made pending physician consult. Following ENT medical intention for ETD, the benefits and limitations of amplification should be discussed with the patient.     Rikki Jackson CCC/NORA  Audiologist  [id number]  NPI#:  [id number]

## 2021-02-13 ENCOUNTER — HOSPITAL ENCOUNTER (OUTPATIENT)
Dept: CT IMAGING | Age: 71
Discharge: HOME | End: 2021-02-15
Payer: MEDICARE

## 2021-02-13 DIAGNOSIS — J32.4 CHRONIC PANSINUSITIS: ICD-10-CM

## 2021-02-13 DIAGNOSIS — J30.89 SEASONAL ALLERGIC RHINITIS DUE TO OTHER ALLERGIC TRIGGER: ICD-10-CM

## 2021-02-13 PROCEDURE — 70486 CT MAXILLOFACIAL W/O DYE: CPT

## 2021-03-16 ENCOUNTER — TELEPHONE (OUTPATIENT)
Dept: ADMINISTRATIVE | Age: 71
End: 2021-03-16

## 2021-04-20 ENCOUNTER — OFFICE VISIT (OUTPATIENT)
Dept: ENT CLINIC | Age: 71
End: 2021-04-20
Payer: MEDICARE

## 2021-04-20 VITALS
WEIGHT: 170 LBS | BODY MASS INDEX: 29.02 KG/M2 | SYSTOLIC BLOOD PRESSURE: 161 MMHG | DIASTOLIC BLOOD PRESSURE: 90 MMHG | HEIGHT: 64 IN | HEART RATE: 96 BPM

## 2021-04-20 DIAGNOSIS — J32.4 CHRONIC PANSINUSITIS: Primary | ICD-10-CM

## 2021-04-20 DIAGNOSIS — J30.89 SEASONAL ALLERGIC RHINITIS DUE TO OTHER ALLERGIC TRIGGER: ICD-10-CM

## 2021-04-20 PROCEDURE — 99213 OFFICE O/P EST LOW 20 MIN: CPT | Performed by: OTOLARYNGOLOGY

## 2021-04-20 NOTE — PROGRESS NOTES
DEPARTMENT OF OTOLARYNGOLOGY   HISTORY AND PHYSICAL      PATIENT: Sammy Tenorio : 1950 (19 y.o.)   DATE: 2021  REFERRED BY: No referring provider defined for this encounter. HISTORY OBTAINED FROM:  patient    CHIEF COMPLAINT:  had concerns including Other (6 wk f/u Ct results). HISTORY OF PRESENT ILLNESS:                                                                                        Sammy Tenorio is a(n) 66 y.o. female presents allergic rhinitis. Was treated with Astelin and Flonase without much improvement. CT was ordered and here for results. Still complains of PND, globus sensation, facial pressure, \"swishing in the head\". Denies recent sinus infection. Denies f/c. Recall 21  Allergic rhinitis and sinus infection. Had sinus surgery about 35 years ago. Has PND, globus sensation, facial pressure, \"swishing in the head\". Has about 4 episodes of sinus infection per year that require abx. Treated with Singulair, Zyrtec, Flonase w/o much improvement. She did not use Flonase daily. No imaging has been done recent. Currently on immunotherapy for 20 years. Also complains of hearing loss for several years. Talking loudly, has to have TV on loud volume to hear. Her children thinks she has trouble hearing. Has trouble hearing especially in crowded environment. Denies noise exposure. Denies room spinning vertigo. Denies tinnitus except for the \"swishing in the head. \"     Past Medical History:   Diagnosis Date    Asthma     CAD (coronary artery disease)     History of blood transfusion     Hyperlipidemia     Hypertension         Past Surgical History:   Procedure Laterality Date    APPENDECTOMY      BREAST SURGERY      COLONOSCOPY      CORONARY ARTERY BYPASS GRAFT      HYSTERECTOMY      KNEE SURGERY           Current Outpatient Medications:     venlafaxine (EFFEXOR XR) 75 MG extended release capsule, TAKE ONE CAPSULE BY MOUTH EVERY DAY, Disp: , Rfl:    famotidine (PEPCID) 20 MG tablet, Take 20 mg by mouth 2 times daily, Disp: , Rfl:     docusate sodium (COLACE) 100 MG capsule, Take 100 mg by mouth 2 times daily, Disp: , Rfl:     azelastine (ASTELIN) 0.1 % nasal spray, 2 sprays by Nasal route 2 times daily Use in each nostril as directed, Disp: 4 Bottle, Rfl: 1    pantoprazole (PROTONIX) 40 MG tablet, Take 40 mg by mouth daily, Disp: , Rfl:     Omega-3 Fatty Acids (FISH OIL) 1200 MG CPDR, Take by mouth, Disp: , Rfl:     dicyclomine (BENTYL) 10 MG capsule, Take 10 mg by mouth 4 times daily (before meals and nightly), Disp: , Rfl:     cetirizine (ZYRTEC) 10 MG tablet, Take 10 mg by mouth daily, Disp: , Rfl:     ZETIA 10 MG tablet, , Disp: , Rfl: 3    clopidogrel (PLAVIX) 75 MG tablet, Take 75 mg by mouth daily. , Disp: , Rfl:     lisinopril (PRINIVIL;ZESTRIL) 10 MG tablet, Take 10 mg by mouth daily. , Disp: , Rfl:     albuterol (PROAIR HFA) 108 (90 BASE) MCG/ACT inhaler, Inhale 2 puffs into the lungs every 6 hours as needed for Wheezing., Disp: , Rfl:     zolpidem (AMBIEN) 10 MG tablet, Take 10 mg by mouth nightly as needed for Sleep., Disp: , Rfl:     montelukast (SINGULAIR) 10 MG tablet, Take 10 mg by mouth nightly., Disp: , Rfl:     Allergies   Allergen Reactions    Amoxicillin-Pot Clavulanate     Biaxin [Clarithromycin]     Keflex [Cephalexin]     Levofloxacin     Lipitor     Lovastatin     Macrodantin [Nitrofurantoin Macrocrystal]     Sulfa Antibiotics     Asa Arthritis Strength-Antacid [Aspirin Buff, Al Hyd-Mg Hyd]        Family History   Adopted: Yes   Problem Relation Age of Onset    Arthritis Mother        Social History     Socioeconomic History    Marital status:      Spouse name: Not on file    Number of children: Not on file    Years of education: Not on file    Highest education level: Not on file   Occupational History    Not on file   Social Needs    Financial resource strain: Not on file    Food insecurity Worry: Not on file     Inability: Not on file    Transportation needs     Medical: Not on file     Non-medical: Not on file   Tobacco Use    Smoking status: Never Smoker    Smokeless tobacco: Never Used   Substance and Sexual Activity    Alcohol use: Yes     Alcohol/week: 3.0 standard drinks     Types: 3 Glasses of wine per week    Drug use: No    Sexual activity: Never   Lifestyle    Physical activity     Days per week: Not on file     Minutes per session: Not on file    Stress: Not on file   Relationships    Social connections     Talks on phone: Not on file     Gets together: Not on file     Attends [employer] service: Not on file     Active member of club or organization: Not on file     Attends meetings of clubs or organizations: Not on file     Relationship status: Not on file    Intimate partner violence     Fear of current or ex partner: Not on file     Emotionally abused: Not on file     Physically abused: Not on file     Forced sexual activity: Not on file   Other Topics Concern    Not on file   Social History Narrative    Not on file       REVIEW OF SYSTEMS:  Review of Systems  Constitutional: Negative for chills and fever. Eyes: Negative for discharge and itching. ENT: Negative for congestion, ear discharge, ear pain, hearing loss and sore throat. Respiratory: Negative for chest tightness and shortness of breath. Cardiovascular: Negative for chestpain and palpitations. Gastrointestinal: Negative for abdominal distention and abdominal pain. Endocrinology: Negative for heat and cold intolerance.     Neurological: Negative for focal weaknessor seizure   Skin: Negative for rash and itchiness   Psychiatric: Negative for depression and hallucinations     PHYSICAL EXAM:                                                                                                                BP (!) 161/90 (Site: Left Upper Arm, Position: Sitting, Cuff Size: Large Adult)   Pulse 96   Ht 5' 4\" (1.626 m)   Wt 170 lb (77.1 kg)   BMI 29.18 kg/m²   Physical Exam  Constitutional: Appears well-developed and well-nourished. Appears as stated age. Eyes: Lids and lashes normal, pupils equal, extra ocular muscles intact, sclera clear   ENT: Sinuses nontender on palpation, external ears and ear canals without lesions, left tympanic membrane tympanic membrane intact without effusion or masses, right tympanic membrane tympanic membrane intact without effusion or masses, nasal septal midline, bilateral inferior turbinates boggy, minimal rhinorrhea, lips normal, good dentition, gums normal, oral pharynx with moist mucus membranes, normal voice   Neck:  Supple, symmetrical, trachea midline, no adenopathy, thyroid symmetric, not enlarged and no tenderness, skin normal   Respiratory: No increased work of breathing. No stridor or wheezes   Cardiovascular: Regular rate   Skin: Warm and dry   Neurologic:  Alert and oriented, CN II-XII grossly intact           ASSESSMENTAND PLAN:                                                                                                   · CT sinus reviewed -- unremarkable  · Audiology referral for hearing aids  · Continue Astelin and Flonase  · Recommend Clarifix for PND since patient is refractory to medical therapy. Risks, benefits and options were discussed. Risks including but not limited to pain, bleeding, infection, scarring, damage to surrounding structures, and need for further surgery. All of questions were answered and patient elected to proceed. · Follow up in 1 week post op or sooner if symptoms acutely exacerbate    Patient was seen and examined with attending physician, who agrees with the assessment and plans. Philip Newman DO  Resident Physician  [facility]  Otolaryngology Residency  4/20/2021  10:41 AM           Ed Armin Fierro Confer  1950      I have discussed the case, including pertinent history and exam findings with the resident.  I have seen and examined the patient and the key elements of the encounter have been performed by me. I agree with the assessment, plan and orders as documented by the resident. Patient here for follow up of medical problems. Remainder of medical problems as per resident note.       Daniela Mondragon,   5/13/21

## 2021-06-22 ENCOUNTER — TELEPHONE (OUTPATIENT)
Dept: ENT CLINIC | Age: 71
End: 2021-06-22

## 2022-02-08 ENCOUNTER — TELEPHONE (OUTPATIENT)
Dept: ADMINISTRATIVE | Age: 72
End: 2022-02-08

## 2022-02-08 NOTE — TELEPHONE ENCOUNTER
Pt calling to schedule a past due yearly (last seen Sept 2019) with Dr. Tacho Kolb - I scheduled her for next opening 3/29/22. In the meantime, she mentions that she has been experiencing fatigue for the past 2 weeks and some fluttering. I informed her I would send a message and if you make any further changes/recommendations/apt - you would return her call. Thank you.

## 2022-03-29 ENCOUNTER — OFFICE VISIT (OUTPATIENT)
Dept: CARDIOLOGY CLINIC | Age: 72
End: 2022-03-29
Payer: MEDICARE

## 2022-03-29 VITALS
BODY MASS INDEX: 28.66 KG/M2 | HEART RATE: 92 BPM | HEIGHT: 64 IN | WEIGHT: 167.9 LBS | RESPIRATION RATE: 16 BRPM | SYSTOLIC BLOOD PRESSURE: 138 MMHG | DIASTOLIC BLOOD PRESSURE: 76 MMHG

## 2022-03-29 DIAGNOSIS — Z86.16 HISTORY OF COVID-19: ICD-10-CM

## 2022-03-29 DIAGNOSIS — E78.2 MIXED HYPERLIPIDEMIA: ICD-10-CM

## 2022-03-29 DIAGNOSIS — I25.10 CORONARY ARTERY DISEASE INVOLVING NATIVE CORONARY ARTERY OF NATIVE HEART WITHOUT ANGINA PECTORIS: Primary | ICD-10-CM

## 2022-03-29 DIAGNOSIS — I10 PRIMARY HYPERTENSION: ICD-10-CM

## 2022-03-29 PROCEDURE — 93000 ELECTROCARDIOGRAM COMPLETE: CPT | Performed by: INTERNAL MEDICINE

## 2022-03-29 PROCEDURE — 99214 OFFICE O/P EST MOD 30 MIN: CPT | Performed by: INTERNAL MEDICINE

## 2022-03-29 RX ORDER — BENZONATATE 100 MG/1
CAPSULE ORAL
COMMUNITY
Start: 2022-01-11

## 2022-03-29 RX ORDER — EZETIMIBE 10 MG/1
10 TABLET ORAL DAILY
COMMUNITY

## 2022-03-29 RX ORDER — ALPRAZOLAM 0.5 MG/1
0.5 TABLET ORAL NIGHTLY PRN
COMMUNITY
Start: 2022-03-26

## 2022-03-29 RX ORDER — OMEPRAZOLE 20 MG/1
20 CAPSULE, DELAYED RELEASE ORAL 2 TIMES DAILY
COMMUNITY

## 2022-03-29 RX ORDER — ACYCLOVIR 200 MG/1
CAPSULE ORAL
COMMUNITY
Start: 2022-03-04

## 2022-03-29 RX ORDER — TRAMADOL HYDROCHLORIDE 50 MG/1
50 TABLET ORAL EVERY 6 HOURS PRN
COMMUNITY

## 2022-03-29 NOTE — PROGRESS NOTES
OFFICE FOLLOW-UP    Name: Geovany Perez     Age: 66 y.o. Primary Care Physician: Tequila Velasquez DO    Date of Service: 3/29/2022     Chief Complaint: Follow-up for CAD    Interim History:  She denies exertional chest pain, worsening SOB, syncope, PND, or orthopnea. At the time of her 8/2019 office visit, she reported fatigue and intermittent left arm pain (occasionally with exertion) --> no recent left arm pain; +recurrent fatigue and intermittent palpitations following COVID-19 infection in 12/2021. Currently with no active cardiac complaints at rest. SR on EKG. Review of Systems:   Cardiac: As per HPI  General: No fever, chills  Pulmonary: As per HPI  HEENT: No visual disturbances, difficult swallowing  GI: No nausea, vomiting  : No dysuria, hematuria  Endocrine: No thyroid disease or DM  Musculoskeletal: BOYLE x 4, +chronic back pain  Skin: Intact, no rashes  Neuro: No headache, seizures  Psych: Currently with no depression, anxiety    Past Medical History:  Past Medical History:   Diagnosis Date    Asthma     CAD (coronary artery disease)     History of blood transfusion     Hyperlipidemia     Hypertension      Past Surgical History:  Past Surgical History:   Procedure Laterality Date    APPENDECTOMY      BREAST SURGERY      COLONOSCOPY      CORONARY ARTERY BYPASS GRAFT      HYSTERECTOMY      KNEE SURGERY       Family History:  Non-contributory    Social History:  Social History     Socioeconomic History    Marital status:      Spouse name: Not on file    Number of children: Not on file    Years of education: Not on file    Highest education level: Not on file   Occupational History    Not on file   Tobacco Use    Smoking status: Never Smoker    Smokeless tobacco: Never Used   Vaping Use    Vaping Use: Never used   Substance and Sexual Activity    Alcohol use:  Yes     Alcohol/week: 3.0 standard drinks     Types: 3 Glasses of wine per week    Drug use: No    Sexual activity: Never   Other Topics Concern    Not on file   Social History Narrative    Not on file     Social Determinants of Health     Financial Resource Strain:     Difficulty of Paying Living Expenses: Not on file   Food Insecurity:     Worried About Running Out of Food in the Last Year: Not on file    Yao of Food in the Last Year: Not on file   Transportation Needs:     Lack of Transportation (Medical): Not on file    Lack of Transportation (Non-Medical): Not on file   Physical Activity:     Days of Exercise per Week: Not on file    Minutes of Exercise per Session: Not on file   Stress:     Feeling of Stress : Not on file   Social Connections:     Frequency of Communication with Friends and Family: Not on file    Frequency of Social Gatherings with Friends and Family: Not on file    Attends Christianity Services: Not on file    Active Member of [address] [place] [address] [employer] or Organizations: Not on file    Attends Club or Organization Meetings: Not on file    Marital Status: Not on file   Intimate Partner Violence:     Fear of Current or Ex-Partner: Not on file    Emotionally Abused: Not on file    Physically Abused: Not on file    Sexually Abused: Not on file   Housing Stability:     Unable to Pay for Housing in the Last Year: Not on file    Number of [place] in the Last Year: Not on file    Unstable Housing in the Last Year: Not on file       Allergies:   Allergies   Allergen Reactions    Amoxicillin-Pot Clavulanate     Biaxin [Clarithromycin]     Keflex [Cephalexin]     Levofloxacin     Lipitor     Lovastatin     Macrodantin [Nitrofurantoin Macrocrystal]     Sulfa Antibiotics     Asa Arthritis Strength-Antacid [Aspirin Buff, Al Hyd-Mg Hyd]        Current Medications:  Current Outpatient Medications   Medication Sig Dispense Refill    omeprazole (PRILOSEC) 20 MG delayed release capsule Take 20 mg by mouth 2 times daily      ezetimibe (ZETIA) 10 MG tablet Take 10 mg by mouth daily      traMADol (ULTRAM) 50 MG tablet Take 50 mg by mouth every 6 hours as needed for Pain.  Multiple Vitamin (MULTIVITAMIN ADULT PO) Take by mouth daily      ALPRAZolam (XANAX) 0.5 MG tablet 0.5 mg nightly as needed.  acyclovir (ZOVIRAX) 200 MG capsule TAKE ONE CAPSULE BY MOUTH EVERY 6 HOURS      benzonatate (TESSALON) 100 MG capsule TAKE ONE (1) CAPSULE BY MOUTH EVERY 8 HOURS IF NEEDED FOR COUGH      venlafaxine (EFFEXOR XR) 75 MG extended release capsule TAKE ONE CAPSULE BY MOUTH EVERY DAY      docusate sodium (COLACE) 100 MG capsule Take 100 mg by mouth 2 times daily      azelastine (ASTELIN) 0.1 % nasal spray 2 sprays by Nasal route 2 times daily Use in each nostril as directed 4 Bottle 1    Omega-3 Fatty Acids (FISH OIL) 1200 MG CPDR Take by mouth      dicyclomine (BENTYL) 10 MG capsule Take 10 mg by mouth 4 times daily (before meals and nightly)      cetirizine (ZYRTEC) 10 MG tablet Take 10 mg by mouth daily      ZETIA 10 MG tablet   3    clopidogrel (PLAVIX) 75 MG tablet Take 75 mg by mouth daily.  lisinopril (PRINIVIL;ZESTRIL) 10 MG tablet Take 10 mg by mouth daily.  albuterol (PROAIR HFA) 108 (90 BASE) MCG/ACT inhaler Inhale 2 puffs into the lungs every 6 hours as needed for Wheezing.  zolpidem (AMBIEN) 10 MG tablet Take 10 mg by mouth nightly as needed for Sleep.  montelukast (SINGULAIR) 10 MG tablet Take 10 mg by mouth nightly.  famotidine (PEPCID) 20 MG tablet Take 20 mg by mouth 2 times daily (Patient not taking: Reported on 3/29/2022)      pantoprazole (PROTONIX) 40 MG tablet Take 40 mg by mouth daily (Patient not taking: Reported on 3/29/2022)       No current facility-administered medications for this visit.      Physical Exam:  /76   Pulse 92   Resp 16   Ht 5' 4\" (1.626 m)   Wt 167 lb 14.4 oz (76.2 kg)   BMI 28.82 kg/m²   Wt Readings from Last 3 Encounters:   03/29/22 167 lb 14.4 oz (76.2 kg)   04/20/21 170 lb (77.1 kg)   02/09/21 173 lb (78.5 kg) Appearance: Awake, alert and oriented x 3, no acute respiratory distress  Skin: Intact, no erythema, no rash  Head: Normocephalic, atraumatic  Eyes: EOMI, no conjunctival erythema  ENMT: No pharyngeal erythema, MMM, no rhinorrhea, no dentures  Neck: Supple, no elevated JVP, no carotid bruits  Lungs: Clear to auscultation bilaterally. No wheezes, rales, or rhonchi. Cardiac: Regular rate and rhythm, +S1S2, no murmurs apparent  Abdomen: Soft, nontender, non-distended, +bowel sounds  Extremities: Moves all extremities x 4, no lower extremity edema  Neurologic: No focal motor deficits apparent, normal mood and affect, alert and oriented x 3    Laboratory Tests:  Lab Results   Component Value Date    CREATININE 1.0 04/10/2019    BUN 10 04/10/2019     04/10/2019    K 3.9 04/10/2019     04/10/2019    CO2 26 04/10/2019     Lab Results   Component Value Date    WBC 6.5 04/10/2019    RBC 4.38 04/10/2019    HGB 13.6 04/10/2019    HCT 41.9 04/10/2019    MCV 95.7 04/10/2019    MCH 31.1 04/10/2019    MCHC 32.5 04/10/2019    RDW 13.0 04/10/2019     04/10/2019    MPV 9.3 04/10/2019     Lab Results   Component Value Date    MG 2.1 09/19/2014     Lab Results   Component Value Date    CHOL 245 (H) 04/10/2019     Lab Results   Component Value Date    TRIG 141 04/10/2019     Lab Results   Component Value Date    HDL 70 04/10/2019     Lab Results   Component Value Date    LDLCALC 147 (H) 04/10/2019     Lab Results   Component Value Date    LABVLDL 28 04/10/2019     Cardiac Tests:  ECG: SR, rate 92, NSSTT changes    Exercise nuclear stress test: 9/12/19 (Dr. Jennifer Hall)  Gated SPECT left ventricular ejection fraction was calculated to be 91%, with normal myocardial thickening and wall motion. Impression:    1. Exercise EKG was negative. 2. The patient experienced no chest pain with exercise.    3. The myocardial perfusion imaging was normal.    4. Overall left ventricular systolic function was normal without regional wall motion abnormalities. 5. Tracey treadmill score was 6.5 implying low risk. 6. Exercise capacity was average. 7. Low risk general exercise treadmill test.    ASSESSMENT / PLAN:  1. CAD s/p 5V CABG in 2001 with subsequent PCI ~ 6 months later. Negative stress test in 2007. Small mild reversible defect at the basal inferior wall on 3/2014 stress test (medically managed, normal LV function with EF 60% on echocardiogram at that time). Previously followed by Dr. Salas Dave. +recent fatigue and intermittent left arm pain. 2. HTN - typically controlled, most recent 's-130's  3. HLD - on zetia, reported statin intolerance (multiple statins)  4. Chronic back pain  5. Mild MR and mild TR on 3/2014 echocardiogram  - 2/2015 labs: K 4.3, Cr 0.87, normal LFT's, chol 202, trig 70, LDL 92, HDL 86  - 5/2017 labs: K 4.4, Cr 0.87, normal LFT's, chol 292, trig 232, , HDL 60  - 4/2019 labs: chol 245, trig 141, , HDL 70  6. COVID-19 infection in 12/2021  7. Palpitations    - Results of 9/2019 exercise nuclear stress test reviewed with the patient today (pending clinical course, options for additional CAD work-up reviewed today)  - Discussed option of cardiac monitoring (palpitations improved recently)  - Continue current medications (including plavix, zetia, and lisinopril)  - Monitor lipid panel / repatha recently prescribed (treatment pending)    Greater than 30 minutes was spent counseling the patient, reviewing the rationale for the above recommendations and reviewing the patient's current medication list, problem list and results of all previously ordered testing.     Kary Cage MD  [facility]) Cardiology

## 2024-12-04 ENCOUNTER — HOSPITAL ENCOUNTER (OUTPATIENT)
Dept: PREADMISSION TESTING | Age: 74
Discharge: HOME | End: 2024-12-04
Payer: MEDICARE

## 2024-12-04 ENCOUNTER — ANESTHESIA EVENT (OUTPATIENT)
Dept: OPERATING ROOM | Age: 74
DRG: 468 | End: 2024-12-04
Payer: MEDICARE

## 2024-12-04 VITALS
SYSTOLIC BLOOD PRESSURE: 144 MMHG | HEART RATE: 82 BPM | OXYGEN SATURATION: 97 % | TEMPERATURE: 97.7 F | WEIGHT: 155.6 LBS | RESPIRATION RATE: 16 BRPM | BODY MASS INDEX: 25.92 KG/M2 | DIASTOLIC BLOOD PRESSURE: 63 MMHG | HEIGHT: 65 IN

## 2024-12-04 LAB
ANION GAP SERPL CALCULATED.3IONS-SCNC: 10 MMOL/L (ref 7–16)
BUN SERPL-MCNC: 17 MG/DL (ref 6–23)
CALCIUM SERPL-MCNC: 9.7 MG/DL (ref 8.6–10.2)
CHLORIDE SERPL-SCNC: 104 MMOL/L (ref 98–107)
CO2 SERPL-SCNC: 24 MMOL/L (ref 22–29)
CREAT SERPL-MCNC: 1 MG/DL (ref 0.5–1)
ERYTHROCYTE [DISTWIDTH] IN BLOOD BY AUTOMATED COUNT: 12.7 % (ref 11.5–15)
GFR, ESTIMATED: 56 ML/MIN/1.73M2
GLUCOSE SERPL-MCNC: 114 MG/DL (ref 74–99)
HCT VFR BLD AUTO: 40.8 % (ref 34–48)
HGB BLD-MCNC: 13.5 G/DL (ref 11.5–15.5)
MCH RBC QN AUTO: 32.1 PG (ref 26–35)
MCHC RBC AUTO-ENTMCNC: 33.1 G/DL (ref 32–34.5)
MCV RBC AUTO: 96.9 FL (ref 80–99.9)
PLATELET # BLD AUTO: 273 K/UL (ref 130–450)
PMV BLD AUTO: 9.3 FL (ref 7–12)
POTASSIUM SERPL-SCNC: 4.3 MMOL/L (ref 3.5–5)
RBC # BLD AUTO: 4.21 M/UL (ref 3.5–5.5)
SODIUM SERPL-SCNC: 138 MMOL/L (ref 132–146)
WBC OTHER # BLD: 5.6 K/UL (ref 4.5–11.5)

## 2024-12-04 PROCEDURE — 36415 COLL VENOUS BLD VENIPUNCTURE: CPT

## 2024-12-04 PROCEDURE — 80048 BASIC METABOLIC PNL TOTAL CA: CPT

## 2024-12-04 PROCEDURE — 87081 CULTURE SCREEN ONLY: CPT

## 2024-12-04 PROCEDURE — 85027 COMPLETE CBC AUTOMATED: CPT

## 2024-12-04 RX ORDER — AMLODIPINE AND BENAZEPRIL HYDROCHLORIDE 5; 20 MG/1; MG/1
1 CAPSULE ORAL DAILY
COMMUNITY
Start: 2024-12-04

## 2024-12-04 RX ORDER — DULOXETIN HYDROCHLORIDE 30 MG/1
30 CAPSULE, DELAYED RELEASE ORAL NIGHTLY
COMMUNITY
Start: 2024-11-18

## 2024-12-04 RX ORDER — ROPIVACAINE HYDROCHLORIDE 5 MG/ML
30 INJECTION, SOLUTION EPIDURAL; INFILTRATION; PERINEURAL
OUTPATIENT
Start: 2024-12-04 | End: 2024-12-05

## 2024-12-04 RX ORDER — FENTANYL CITRATE 50 UG/ML
25 INJECTION, SOLUTION INTRAMUSCULAR; INTRAVENOUS PRN
OUTPATIENT
Start: 2024-12-04

## 2024-12-04 RX ORDER — MIDAZOLAM HYDROCHLORIDE 2 MG/2ML
0.5 INJECTION, SOLUTION INTRAMUSCULAR; INTRAVENOUS PRN
OUTPATIENT
Start: 2024-12-04

## 2024-12-04 ASSESSMENT — PROMIS GLOBAL HEALTH SCALE
IN THE PAST 7 DAYS, HOW WOULD YOU RATE YOUR FATIGUE ON AVERAGE [ON A SCALE FROM 1 (NONE) TO 5 (VERY SEVERE)]?: MODERATE
IN THE PAST 7 DAYS, HOW WOULD YOU RATE YOUR PAIN ON AVERAGE [ON A SCALE FROM 0 (NO PAIN) TO 10 (WORST IMAGINABLE PAIN)]?: 5
IN GENERAL, WOULD YOU SAY YOUR HEALTH IS...[ON A SCALE OF 1 (POOR) TO 5 (EXCELLENT)]: VERY GOOD
IN GENERAL, WOULD YOU SAY YOUR QUALITY OF LIFE IS...[ON A SCALE OF 1 (POOR) TO 5 (EXCELLENT)]: VERY GOOD
SUM OF RESPONSES TO QUESTIONS 2, 4, 5, & 10: 12
IN GENERAL, HOW WOULD YOU RATE YOUR SATISFACTION WITH YOUR SOCIAL ACTIVITIES AND RELATIONSHIPS [ON A SCALE OF 1 (POOR) TO 5 (EXCELLENT)]?: FAIR
WHO IS THE PERSON COMPLETING THE PROMIS V1.1 SURVEY?: SELF
IN GENERAL, HOW WOULD YOU RATE YOUR MENTAL HEALTH, INCLUDING YOUR MOOD AND YOUR ABILITY TO THINK [ON A SCALE OF 1 (POOR) TO 5 (EXCELLENT)]?: GOOD
IN GENERAL, HOW WOULD YOU RATE YOUR PHYSICAL HEALTH [ON A SCALE OF 1 (POOR) TO 5 (EXCELLENT)]?: GOOD
HOW IS THE PROMIS V1.1 BEING ADMINISTERED?: ELECTRONIC
IN GENERAL, PLEASE RATE HOW WELL YOU CARRY OUT YOUR USUAL SOCIAL ACTIVITIES (INCLUDES ACTIVITIES AT HOME, AT WORK, AND IN YOUR COMMUNITY, AND RESPONSIBILITIES AS A PARENT, CHILD, SPOUSE, EMPLOYEE, FRIEND, ETC) [ON A SCALE OF 1 (POOR) TO 5 (EXCELLENT)]?: GOOD
TO WHAT EXTENT ARE YOU ABLE TO CARRY OUT YOUR EVERYDAY PHYSICAL ACTIVITIES SUCH AS WALKING, CLIMBING STAIRS, CARRYING GROCERIES, OR MOVING A CHAIR [ON A SCALE OF 1 (NOT AT ALL) TO 5 (COMPLETELY)]?: MOSTLY
SUM OF RESPONSES TO QUESTIONS 3, 6, 7, & 8: 15
IN THE PAST 7 DAYS, HOW OFTEN HAVE YOU BEEN BOTHERED BY EMOTIONAL PROBLEMS, SUCH AS FEELING ANXIOUS, DEPRESSED, OR IRRITABLE [ON A SCALE FROM 1 (NEVER) TO 5 (ALWAYS)]?: SOMETIMES

## 2024-12-04 ASSESSMENT — KOOS JR
KOOS JR TOTAL INTERVAL SCORE: 59.381
GOING UP OR DOWN STAIRS: MILD
BENDING TO THE FLOOR TO PICK UP OBJECT: SEVERE
TWISING OR PIVOTING ON KNEE: MILD
RISING FROM SITTING: MILD
HOW SEVERE IS YOUR KNEE STIFFNESS AFTER FIRST WAKING IN MORNING: MILD
STANDING UPRIGHT: SEVERE
STRAIGHTENING KNEE FULLY: MILD

## 2024-12-04 ASSESSMENT — PAIN DESCRIPTION - PAIN TYPE: TYPE: CHRONIC PAIN

## 2024-12-04 ASSESSMENT — PAIN SCALES - GENERAL: PAINLEVEL_OUTOF10: 3

## 2024-12-04 ASSESSMENT — PAIN DESCRIPTION - ORIENTATION: ORIENTATION: RIGHT

## 2024-12-04 ASSESSMENT — PAIN DESCRIPTION - DESCRIPTORS: DESCRIPTORS: DISCOMFORT

## 2024-12-04 ASSESSMENT — PAIN DESCRIPTION - LOCATION: LOCATION: KNEE

## 2024-12-04 NOTE — DISCHARGE INSTRUCTIONS
Signed       [facility] PRE-ADMISSION TESTING INSTRUCTIONS     The Preadmission Testing patient is instructed accordingly using the following criteria (check applicable):     ARRIVAL INSTRUCTIONS:  [x] Parking the day of Surgery is located in the Main Entrance lot.  Upon entering the door, make an immediate right to the surgery reception desk     [x] Bring photo ID and insurance card     [] Bring in a copy of Living will or Durable Power of  papers.     [x] Please be sure to arrange for responsible adult to provide transportation to and from the hospital     [x] Please arrange for responsible adult to be with you for the 24 hour period post procedure due to having anesthesia     [x] If you awake am of surgery not feeling well or have temperature >100 please call [phone]     GENERAL INSTRUCTIONS:     [x] Follow instructions for hydration that have been provided to you at your Pre-Admission Visit. Solid food until midnight then clear liquids. No gum, candy or mints.     [x] You may brush your teeth, but do not swallow any water     [x] Take medications as instructed with 1-2 oz of water     [x] Stop herbal supplements and vitamins 5 days prior to procedure     [x] Follow preop dosing of blood thinners per physician instructions     [] Take 1/2 dose of evening insulin, but no insulin after midnight     [] No oral diabetic medications after midnight     [] If diabetic and have low blood sugar or feel symptomatic, take 1-2oz apple juice only     [x] Bring inhalers day of surgery     [] Bring C-PAP/ Bi-Pap day of surgery     [] Bring urine specimen day of surgery     [x] Shower or bath with soap, lather and rinse well, PM beforeSurgery, no lotion, powders or creams to surgical site     [] Follow bowel prep as instructed per surgeon     [x] No tobacco products within 24 hours of surgery      [x] No alcohol or illegal drug use within 24 hours of surgery.     [x] Jewelry, body

## 2024-12-04 NOTE — PROGRESS NOTES
Outpatient Cardiology Office Visit    Primary Cardiologist: Dr Freeman    Reason for Visit: Cardiac clearance      HISTORY OF PRESENT ILLNESS:   Patient is a 74-year-old female who is previously known to [facility] cardiology through Dr Freeman.  She was last seen outpatient 3/29/2022 for follow-up of CAD.  Patient complains of recent recurrent fatigue and palpitations since her recent COVID-19 infection.  Repatha was also recently prescribed due to statin intolerance.  No other changes were made at that time.  Patient was continued on monotherapy of Plavix.  Today, patient presenting for cardiac clearance for planned right total knee arthroplasty 12/16/2024.  Patient tells me several months ago she had a mechanical fall which she believes injured her right knee.  She has been told that the hardware in her right knee from prior arthroplasty is broken and needs to be taken out and completely replaced.  She reports since that fall she has had limited ROM of right knee.  She reports prior to knee injury she was able to ascend cellar steps with no chest pain or shortness of breath.  She reports she currently works at [employer] as a  5 hours a day for 3 days a week.  She reports she walks often at work with no exertional symptoms.  She does not do any formal exercise but reports she does cook and clean at home.  She denies any recent chest pain, orthopnea, PND, SOB/RHODES, palpitations, dizziness, or heart racing.  She denies bleeding issues with Plavix.  She reports he has been compliant with Plavix although she stopped it yesterday in anticipation of surgery.  She reports she has not been taking her Zetia for unknown reason.  She is willing to restart at this time.  She reports Repatha has been spoken with to her but she has never had a prescribed.  I will work on starting the preapproval process today.  EKG is unchanged from previous.  Physical exam is unremarkable.    Past medical history:  CAD:  CABG X5 2001

## 2024-12-04 NOTE — ANESTHESIA PRE PROCEDURE
Plan      general and regional     ASA 3     (Labs and EKG pending  Patient will need cardiac clearance and optimization - PAT notified)  Induction: intravenous.    MIPS: Postoperative opioids intended and Prophylactic antiemetics administered.  Anesthetic plan and risks discussed with patient.      Plan discussed with CRNA.          Post-op pain plan if not by surgeon: single peripheral nerve block      Kaiden Teran DO   12/4/2024    History, data, and pertinent studies from chart review.  Above represents information available via the shared medical record including previous anesthetic, medication, and allergy history.  Confirmation of above and final disposition per DOS anesthesiologist.    Kaiden Teran DO  Staff Anesthesiologist  December 4, 2024  1:15 PM       PNB Consent:     Benefits, alternatives and risks of PNBs were discussed with patient.  Risks include but are not limited to bleeding, LAST, infection and possible nerve trauma.  PNB was recommended and encouraged as part of multi-modal pain therapy.  All questions were answered.  After consideration of the above, the patient agrees to:    Adductor Canal Block for management of post-op pain.      DOS STAFF ADDENDUM:     Pt seen and examined.  Chart reviewed including anesthesia, medication, and allergy history.     H&P reviewed.  Physical exam updated.  No interval changes to history or physical examination (unless noted above).      NPO status confirmed.     Anesthetic plan, risks, benefits, alternatives discussed with patient.  Patient verbalized an understanding and agrees to proceed.     General anesthesia due to revision of TKA     Alice Montes DO    Chart reviewed and patient assessed. Agreed with above note. Nas JEONG CRNA

## 2024-12-04 NOTE — PROGRESS NOTES
[facility] PRE-ADMISSION TESTING INSTRUCTIONS    The Preadmission Testing patient is instructed accordingly using the following criteria (check applicable):    ARRIVAL INSTRUCTIONS:  [x] Parking the day of Surgery is located in the Main Entrance lot.  Upon entering the door, make an immediate right to the surgery reception desk    [x] Bring photo ID and insurance card    [] Bring in a copy of Living will or Durable Power of  papers.    [x] Please be sure to arrange for responsible adult to provide transportation to and from the hospital    [x] Please arrange for responsible adult to be with you for the 24 hour period post procedure due to having anesthesia    [x] If you awake am of surgery not feeling well or have temperature >100 please call [phone]    GENERAL INSTRUCTIONS:    [x] Follow instructions for hydration that have been provided to you at your Pre-Admission Visit. Solid food until midnight then clear liquids. No gum, candy or mints.    [x] You may brush your teeth, but do not swallow any water    [x] Take medications as instructed with 1-2 oz of water    [x] Stop herbal supplements and vitamins 5 days prior to procedure    [x] Follow preop dosing of blood thinners per physician instructions    [] Take 1/2 dose of evening insulin, but no insulin after midnight    [] No oral diabetic medications after midnight    [] If diabetic and have low blood sugar or feel symptomatic, take 1-2oz apple juice only    [x] Bring inhalers day of surgery    [] Bring C-PAP/ Bi-Pap day of surgery    [] Bring urine specimen day of surgery    [x] Shower or bath with soap, lather and rinse well, PM beforeSurgery, no lotion, powders or creams to surgical site    [] Follow bowel prep as instructed per surgeon    [x] No tobacco products within 24 hours of surgery     [x] No alcohol or illegal drug use within 24 hours of surgery.    [x] Jewelry, body piercing's, eyeglasses, contact lenses and

## 2024-12-05 LAB
EKG ATRIAL RATE: 82 BPM
EKG P AXIS: 21 DEGREES
EKG P-R INTERVAL: 148 MS
EKG Q-T INTERVAL: 390 MS
EKG QRS DURATION: 68 MS
EKG QTC CALCULATION (BAZETT): 455 MS
EKG R AXIS: -2 DEGREES
EKG T AXIS: 51 DEGREES
EKG VENTRICULAR RATE: 82 BPM

## 2024-12-06 LAB
MICROORGANISM SPEC CULT: NORMAL
SPECIMEN DESCRIPTION: NORMAL

## 2024-12-10 ENCOUNTER — OFFICE VISIT (OUTPATIENT)
Dept: CARDIOLOGY CLINIC | Age: 74
End: 2024-12-10
Payer: MEDICARE

## 2024-12-10 VITALS
BODY MASS INDEX: 26.89 KG/M2 | HEART RATE: 96 BPM | TEMPERATURE: 97.1 F | DIASTOLIC BLOOD PRESSURE: 68 MMHG | SYSTOLIC BLOOD PRESSURE: 122 MMHG | WEIGHT: 161.4 LBS | HEIGHT: 65 IN | OXYGEN SATURATION: 96 % | RESPIRATION RATE: 18 BRPM

## 2024-12-10 DIAGNOSIS — I25.10 CORONARY ARTERY DISEASE INVOLVING NATIVE CORONARY ARTERY OF NATIVE HEART WITHOUT ANGINA PECTORIS: ICD-10-CM

## 2024-12-10 DIAGNOSIS — E78.5 HYPERLIPIDEMIA, UNSPECIFIED HYPERLIPIDEMIA TYPE: Primary | ICD-10-CM

## 2024-12-10 DIAGNOSIS — Z01.810 PREOP CARDIOVASCULAR EXAM: ICD-10-CM

## 2024-12-10 DIAGNOSIS — Z78.9 STATIN INTOLERANCE: ICD-10-CM

## 2024-12-10 DIAGNOSIS — E78.5 HYPERLIPIDEMIA, UNSPECIFIED HYPERLIPIDEMIA TYPE: ICD-10-CM

## 2024-12-10 DIAGNOSIS — I15.9 SECONDARY HYPERTENSION: Primary | ICD-10-CM

## 2024-12-10 PROCEDURE — 1123F ACP DISCUSS/DSCN MKR DOCD: CPT

## 2024-12-10 PROCEDURE — 1159F MED LIST DOCD IN RCRD: CPT

## 2024-12-10 PROCEDURE — 3074F SYST BP LT 130 MM HG: CPT

## 2024-12-10 PROCEDURE — 93000 ELECTROCARDIOGRAM COMPLETE: CPT | Performed by: INTERNAL MEDICINE

## 2024-12-10 PROCEDURE — 3078F DIAST BP <80 MM HG: CPT

## 2024-12-10 PROCEDURE — 99214 OFFICE O/P EST MOD 30 MIN: CPT

## 2024-12-10 PROCEDURE — 1160F RVW MEDS BY RX/DR IN RCRD: CPT

## 2024-12-10 RX ORDER — EZETIMIBE 10 MG/1
10 TABLET ORAL DAILY
Qty: 30 TABLET | Refills: 3 | Status: SHIPPED | OUTPATIENT
Start: 2024-12-10

## 2024-12-10 NOTE — PATIENT INSTRUCTIONS
I will discuss cardiac clearance for knee surgery with Dr Freeman later today and call you with his recommendations.    Continue medications as prescribed.    Repatha will be ordered and process started for approval.     Follow-up with Dr Freeman in 6 months, sooner if problems should arise.

## 2024-12-16 ENCOUNTER — APPOINTMENT (OUTPATIENT)
Dept: GENERAL RADIOLOGY | Age: 74
DRG: 468 | End: 2024-12-16
Attending: STUDENT IN AN ORGANIZED HEALTH CARE EDUCATION/TRAINING PROGRAM
Payer: MEDICARE

## 2024-12-16 ENCOUNTER — HOSPITAL ENCOUNTER (INPATIENT)
Age: 74
LOS: 1 days | Discharge: HOME HEALTH | DRG: 468 | End: 2024-12-17
Attending: STUDENT IN AN ORGANIZED HEALTH CARE EDUCATION/TRAINING PROGRAM | Admitting: STUDENT IN AN ORGANIZED HEALTH CARE EDUCATION/TRAINING PROGRAM
Payer: MEDICARE

## 2024-12-16 ENCOUNTER — ANESTHESIA (OUTPATIENT)
Dept: OPERATING ROOM | Age: 74
DRG: 468 | End: 2024-12-16
Payer: MEDICARE

## 2024-12-16 DIAGNOSIS — T84.012A MECHANICAL FAILURE OF PROSTHETIC RIGHT KNEE JOINT (HCC): ICD-10-CM

## 2024-12-16 PROCEDURE — 3700000001 HC ADD 15 MINUTES (ANESTHESIA): Performed by: STUDENT IN AN ORGANIZED HEALTH CARE EDUCATION/TRAINING PROGRAM

## 2024-12-16 PROCEDURE — 0SPC0JZ REMOVAL OF SYNTHETIC SUBSTITUTE FROM RIGHT KNEE JOINT, OPEN APPROACH: ICD-10-PCS | Performed by: STUDENT IN AN ORGANIZED HEALTH CARE EDUCATION/TRAINING PROGRAM

## 2024-12-16 PROCEDURE — 6370000000 HC RX 637 (ALT 250 FOR IP): Performed by: STUDENT IN AN ORGANIZED HEALTH CARE EDUCATION/TRAINING PROGRAM

## 2024-12-16 PROCEDURE — 6360000002 HC RX W HCPCS: Performed by: STUDENT IN AN ORGANIZED HEALTH CARE EDUCATION/TRAINING PROGRAM

## 2024-12-16 PROCEDURE — 2720000010 HC SURG SUPPLY STERILE: Performed by: STUDENT IN AN ORGANIZED HEALTH CARE EDUCATION/TRAINING PROGRAM

## 2024-12-16 PROCEDURE — 2500000003 HC RX 250 WO HCPCS

## 2024-12-16 PROCEDURE — 7100000000 HC PACU RECOVERY - FIRST 15 MIN: Performed by: STUDENT IN AN ORGANIZED HEALTH CARE EDUCATION/TRAINING PROGRAM

## 2024-12-16 PROCEDURE — C1776 JOINT DEVICE (IMPLANTABLE): HCPCS | Performed by: STUDENT IN AN ORGANIZED HEALTH CARE EDUCATION/TRAINING PROGRAM

## 2024-12-16 PROCEDURE — 1200000000 HC SEMI PRIVATE

## 2024-12-16 PROCEDURE — 2580000003 HC RX 258: Performed by: STUDENT IN AN ORGANIZED HEALTH CARE EDUCATION/TRAINING PROGRAM

## 2024-12-16 PROCEDURE — 6360000002 HC RX W HCPCS

## 2024-12-16 PROCEDURE — 2500000003 HC RX 250 WO HCPCS: Performed by: STUDENT IN AN ORGANIZED HEALTH CARE EDUCATION/TRAINING PROGRAM

## 2024-12-16 PROCEDURE — 6360000002 HC RX W HCPCS: Performed by: ANESTHESIOLOGY

## 2024-12-16 PROCEDURE — 87205 SMEAR GRAM STAIN: CPT

## 2024-12-16 PROCEDURE — 3600000014 HC SURGERY LEVEL 4 ADDTL 15MIN: Performed by: STUDENT IN AN ORGANIZED HEALTH CARE EDUCATION/TRAINING PROGRAM

## 2024-12-16 PROCEDURE — 64447 NJX AA&/STRD FEMORAL NRV IMG: CPT | Performed by: ANESTHESIOLOGY

## 2024-12-16 PROCEDURE — G0378 HOSPITAL OBSERVATION PER HR: HCPCS

## 2024-12-16 PROCEDURE — C1713 ANCHOR/SCREW BN/BN,TIS/BN: HCPCS | Performed by: STUDENT IN AN ORGANIZED HEALTH CARE EDUCATION/TRAINING PROGRAM

## 2024-12-16 PROCEDURE — 87070 CULTURE OTHR SPECIMN AEROBIC: CPT

## 2024-12-16 PROCEDURE — 73560 X-RAY EXAM OF KNEE 1 OR 2: CPT

## 2024-12-16 PROCEDURE — 0SRC0J9 REPLACEMENT OF RIGHT KNEE JOINT WITH SYNTHETIC SUBSTITUTE, CEMENTED, OPEN APPROACH: ICD-10-PCS | Performed by: STUDENT IN AN ORGANIZED HEALTH CARE EDUCATION/TRAINING PROGRAM

## 2024-12-16 PROCEDURE — 7100000001 HC PACU RECOVERY - ADDTL 15 MIN: Performed by: STUDENT IN AN ORGANIZED HEALTH CARE EDUCATION/TRAINING PROGRAM

## 2024-12-16 PROCEDURE — 3700000000 HC ANESTHESIA ATTENDED CARE: Performed by: STUDENT IN AN ORGANIZED HEALTH CARE EDUCATION/TRAINING PROGRAM

## 2024-12-16 PROCEDURE — 2580000003 HC RX 258

## 2024-12-16 PROCEDURE — 87075 CULTR BACTERIA EXCEPT BLOOD: CPT

## 2024-12-16 PROCEDURE — 2709999900 HC NON-CHARGEABLE SUPPLY: Performed by: STUDENT IN AN ORGANIZED HEALTH CARE EDUCATION/TRAINING PROGRAM

## 2024-12-16 PROCEDURE — 3600000004 HC SURGERY LEVEL 4 BASE: Performed by: STUDENT IN AN ORGANIZED HEALTH CARE EDUCATION/TRAINING PROGRAM

## 2024-12-16 DEVICE — IMPLANTABLE DEVICE
Type: IMPLANTABLE DEVICE | Site: KNEE | Status: FUNCTIONAL
Brand: PERSONA®

## 2024-12-16 DEVICE — IMPLANTABLE DEVICE
Type: IMPLANTABLE DEVICE | Site: KNEE | Status: FUNCTIONAL
Brand: BIOMET® BONE CEMENT R

## 2024-12-16 DEVICE — IMPLANTABLE DEVICE
Type: IMPLANTABLE DEVICE | Site: KNEE | Status: FUNCTIONAL
Brand: PERSONA® VIVACIT-E®

## 2024-12-16 RX ORDER — OXYCODONE HYDROCHLORIDE 5 MG/1
5 TABLET ORAL EVERY 4 HOURS PRN
Status: DISCONTINUED | OUTPATIENT
Start: 2024-12-16 | End: 2024-12-17 | Stop reason: HOSPADM

## 2024-12-16 RX ORDER — ASPIRIN 81 MG/1
81 TABLET ORAL 2 TIMES DAILY
Status: DISCONTINUED | OUTPATIENT
Start: 2024-12-17 | End: 2024-12-17 | Stop reason: HOSPADM

## 2024-12-16 RX ORDER — FAMOTIDINE 20 MG/1
20 TABLET, FILM COATED ORAL 2 TIMES DAILY
Status: DISCONTINUED | OUTPATIENT
Start: 2024-12-16 | End: 2024-12-16 | Stop reason: DRUGHIGH

## 2024-12-16 RX ORDER — DULOXETIN HYDROCHLORIDE 30 MG/1
30 CAPSULE, DELAYED RELEASE ORAL NIGHTLY
Status: DISCONTINUED | OUTPATIENT
Start: 2024-12-16 | End: 2024-12-17 | Stop reason: HOSPADM

## 2024-12-16 RX ORDER — MIDAZOLAM HYDROCHLORIDE 2 MG/2ML
2 INJECTION, SOLUTION INTRAMUSCULAR; INTRAVENOUS
Status: DISCONTINUED | OUTPATIENT
Start: 2024-12-16 | End: 2024-12-16 | Stop reason: HOSPADM

## 2024-12-16 RX ORDER — PROPOFOL 10 MG/ML
INJECTION, EMULSION INTRAVENOUS
Status: DISCONTINUED | OUTPATIENT
Start: 2024-12-16 | End: 2024-12-16 | Stop reason: SDUPTHER

## 2024-12-16 RX ORDER — DIPHENHYDRAMINE HYDROCHLORIDE 50 MG/ML
25 INJECTION INTRAMUSCULAR; INTRAVENOUS EVERY 6 HOURS PRN
Status: DISCONTINUED | OUTPATIENT
Start: 2024-12-16 | End: 2024-12-17 | Stop reason: HOSPADM

## 2024-12-16 RX ORDER — BISACODYL 10 MG
10 SUPPOSITORY, RECTAL RECTAL DAILY PRN
Status: DISCONTINUED | OUTPATIENT
Start: 2024-12-16 | End: 2024-12-17 | Stop reason: HOSPADM

## 2024-12-16 RX ORDER — SODIUM CHLORIDE 9 MG/ML
INJECTION, SOLUTION INTRAVENOUS PRN
Status: DISCONTINUED | OUTPATIENT
Start: 2024-12-16 | End: 2024-12-16 | Stop reason: HOSPADM

## 2024-12-16 RX ORDER — PROCHLORPERAZINE MALEATE 10 MG
10 TABLET ORAL EVERY 6 HOURS PRN
Status: DISCONTINUED | OUTPATIENT
Start: 2024-12-16 | End: 2024-12-17 | Stop reason: HOSPADM

## 2024-12-16 RX ORDER — PANTOPRAZOLE SODIUM 40 MG/1
40 TABLET, DELAYED RELEASE ORAL
Status: DISCONTINUED | OUTPATIENT
Start: 2024-12-16 | End: 2024-12-17 | Stop reason: HOSPADM

## 2024-12-16 RX ORDER — ACETAMINOPHEN 325 MG/1
650 TABLET ORAL EVERY 6 HOURS
Status: DISCONTINUED | OUTPATIENT
Start: 2024-12-16 | End: 2024-12-17 | Stop reason: HOSPADM

## 2024-12-16 RX ORDER — NALOXONE HYDROCHLORIDE 0.4 MG/ML
INJECTION, SOLUTION INTRAMUSCULAR; INTRAVENOUS; SUBCUTANEOUS PRN
Status: DISCONTINUED | OUTPATIENT
Start: 2024-12-16 | End: 2024-12-16 | Stop reason: HOSPADM

## 2024-12-16 RX ORDER — AMLODIPINE BESYLATE 5 MG/1
5 TABLET ORAL DAILY
Status: DISCONTINUED | OUTPATIENT
Start: 2024-12-16 | End: 2024-12-17 | Stop reason: HOSPADM

## 2024-12-16 RX ORDER — LIDOCAINE HYDROCHLORIDE 20 MG/ML
INJECTION, SOLUTION EPIDURAL; INFILTRATION; INTRACAUDAL; PERINEURAL
Status: DISCONTINUED | OUTPATIENT
Start: 2024-12-16 | End: 2024-12-16 | Stop reason: SDUPTHER

## 2024-12-16 RX ORDER — IPRATROPIUM BROMIDE AND ALBUTEROL SULFATE 2.5; .5 MG/3ML; MG/3ML
1 SOLUTION RESPIRATORY (INHALATION)
Status: DISCONTINUED | OUTPATIENT
Start: 2024-12-16 | End: 2024-12-16 | Stop reason: HOSPADM

## 2024-12-16 RX ORDER — SODIUM CHLORIDE 0.9 % (FLUSH) 0.9 %
5-40 SYRINGE (ML) INJECTION EVERY 12 HOURS SCHEDULED
Status: DISCONTINUED | OUTPATIENT
Start: 2024-12-16 | End: 2024-12-16 | Stop reason: HOSPADM

## 2024-12-16 RX ORDER — CLOPIDOGREL BISULFATE 75 MG/1
75 TABLET ORAL DAILY
Status: DISCONTINUED | OUTPATIENT
Start: 2024-12-17 | End: 2024-12-17 | Stop reason: HOSPADM

## 2024-12-16 RX ORDER — SODIUM CHLORIDE 0.9 % (FLUSH) 0.9 %
5-40 SYRINGE (ML) INJECTION PRN
Status: DISCONTINUED | OUTPATIENT
Start: 2024-12-16 | End: 2024-12-17 | Stop reason: HOSPADM

## 2024-12-16 RX ORDER — SODIUM CHLORIDE 0.9 % (FLUSH) 0.9 %
5-40 SYRINGE (ML) INJECTION PRN
Status: DISCONTINUED | OUTPATIENT
Start: 2024-12-16 | End: 2024-12-16 | Stop reason: HOSPADM

## 2024-12-16 RX ORDER — FAMOTIDINE 20 MG/1
20 TABLET, FILM COATED ORAL DAILY
Status: DISCONTINUED | OUTPATIENT
Start: 2024-12-16 | End: 2024-12-16

## 2024-12-16 RX ORDER — SODIUM CHLORIDE 0.9 % (FLUSH) 0.9 %
5-40 SYRINGE (ML) INJECTION EVERY 12 HOURS SCHEDULED
Status: DISCONTINUED | OUTPATIENT
Start: 2024-12-16 | End: 2024-12-17 | Stop reason: HOSPADM

## 2024-12-16 RX ORDER — PROCHLORPERAZINE EDISYLATE 5 MG/ML
10 INJECTION INTRAMUSCULAR; INTRAVENOUS EVERY 6 HOURS PRN
Status: DISCONTINUED | OUTPATIENT
Start: 2024-12-16 | End: 2024-12-17 | Stop reason: HOSPADM

## 2024-12-16 RX ORDER — HYDRALAZINE HYDROCHLORIDE 20 MG/ML
10 INJECTION INTRAMUSCULAR; INTRAVENOUS
Status: DISCONTINUED | OUTPATIENT
Start: 2024-12-16 | End: 2024-12-16 | Stop reason: HOSPADM

## 2024-12-16 RX ORDER — LABETALOL HYDROCHLORIDE 5 MG/ML
10 INJECTION, SOLUTION INTRAVENOUS
Status: DISCONTINUED | OUTPATIENT
Start: 2024-12-16 | End: 2024-12-16 | Stop reason: HOSPADM

## 2024-12-16 RX ORDER — MIDAZOLAM HYDROCHLORIDE 2 MG/2ML
0.5 INJECTION, SOLUTION INTRAMUSCULAR; INTRAVENOUS PRN
Status: DISCONTINUED | OUTPATIENT
Start: 2024-12-16 | End: 2024-12-16 | Stop reason: HOSPADM

## 2024-12-16 RX ORDER — AMLODIPINE AND BENAZEPRIL HYDROCHLORIDE 5; 20 MG/1; MG/1
1 CAPSULE ORAL DAILY
Status: DISCONTINUED | OUTPATIENT
Start: 2024-12-16 | End: 2024-12-16 | Stop reason: CLARIF

## 2024-12-16 RX ORDER — LISINOPRIL 20 MG/1
20 TABLET ORAL DAILY
Status: DISCONTINUED | OUTPATIENT
Start: 2024-12-16 | End: 2024-12-17 | Stop reason: HOSPADM

## 2024-12-16 RX ORDER — FENTANYL CITRATE 50 UG/ML
25 INJECTION, SOLUTION INTRAMUSCULAR; INTRAVENOUS PRN
Status: DISCONTINUED | OUTPATIENT
Start: 2024-12-16 | End: 2024-12-16 | Stop reason: HOSPADM

## 2024-12-16 RX ORDER — VANCOMYCIN HYDROCHLORIDE 1 G/20ML
INJECTION, POWDER, LYOPHILIZED, FOR SOLUTION INTRAVENOUS PRN
Status: DISCONTINUED | OUTPATIENT
Start: 2024-12-16 | End: 2024-12-16 | Stop reason: ALTCHOICE

## 2024-12-16 RX ORDER — SODIUM CHLORIDE, SODIUM LACTATE, POTASSIUM CHLORIDE, CALCIUM CHLORIDE 600; 310; 30; 20 MG/100ML; MG/100ML; MG/100ML; MG/100ML
INJECTION, SOLUTION INTRAVENOUS CONTINUOUS
Status: DISCONTINUED | OUTPATIENT
Start: 2024-12-16 | End: 2024-12-16

## 2024-12-16 RX ORDER — ALBUTEROL SULFATE 0.83 MG/ML
2.5 SOLUTION RESPIRATORY (INHALATION) EVERY 6 HOURS PRN
Status: DISCONTINUED | OUTPATIENT
Start: 2024-12-16 | End: 2024-12-17 | Stop reason: HOSPADM

## 2024-12-16 RX ORDER — SENNA AND DOCUSATE SODIUM 50; 8.6 MG/1; MG/1
1 TABLET, FILM COATED ORAL 2 TIMES DAILY
Status: DISCONTINUED | OUTPATIENT
Start: 2024-12-16 | End: 2024-12-17 | Stop reason: HOSPADM

## 2024-12-16 RX ORDER — SODIUM CHLORIDE 9 MG/ML
INJECTION, SOLUTION INTRAVENOUS PRN
Status: DISCONTINUED | OUTPATIENT
Start: 2024-12-16 | End: 2024-12-17 | Stop reason: HOSPADM

## 2024-12-16 RX ORDER — EZETIMIBE 10 MG/1
10 TABLET ORAL DAILY
Status: DISCONTINUED | OUTPATIENT
Start: 2024-12-16 | End: 2024-12-17 | Stop reason: HOSPADM

## 2024-12-16 RX ORDER — DEXAMETHASONE SODIUM PHOSPHATE 10 MG/ML
8 INJECTION, SOLUTION INTRAMUSCULAR; INTRAVENOUS ONCE
Status: COMPLETED | OUTPATIENT
Start: 2024-12-16 | End: 2024-12-16

## 2024-12-16 RX ORDER — TRANEXAMIC ACID 10 MG/ML
1000 INJECTION, SOLUTION INTRAVENOUS
Status: COMPLETED | OUTPATIENT
Start: 2024-12-16 | End: 2024-12-16

## 2024-12-16 RX ORDER — SODIUM CHLORIDE 9 MG/ML
INJECTION, SOLUTION INTRAVENOUS CONTINUOUS
Status: DISCONTINUED | OUTPATIENT
Start: 2024-12-16 | End: 2024-12-17 | Stop reason: HOSPADM

## 2024-12-16 RX ORDER — FENTANYL CITRATE 50 UG/ML
INJECTION, SOLUTION INTRAMUSCULAR; INTRAVENOUS
Status: DISCONTINUED | OUTPATIENT
Start: 2024-12-16 | End: 2024-12-16 | Stop reason: SDUPTHER

## 2024-12-16 RX ORDER — OXYCODONE HYDROCHLORIDE 5 MG/1
10 TABLET ORAL EVERY 4 HOURS PRN
Status: DISCONTINUED | OUTPATIENT
Start: 2024-12-16 | End: 2024-12-17 | Stop reason: HOSPADM

## 2024-12-16 RX ORDER — ONDANSETRON 2 MG/ML
INJECTION INTRAMUSCULAR; INTRAVENOUS
Status: DISCONTINUED | OUTPATIENT
Start: 2024-12-16 | End: 2024-12-16 | Stop reason: SDUPTHER

## 2024-12-16 RX ORDER — ONDANSETRON 2 MG/ML
4 INJECTION INTRAMUSCULAR; INTRAVENOUS
Status: DISCONTINUED | OUTPATIENT
Start: 2024-12-16 | End: 2024-12-16 | Stop reason: HOSPADM

## 2024-12-16 RX ORDER — ROCURONIUM BROMIDE 10 MG/ML
INJECTION, SOLUTION INTRAVENOUS
Status: DISCONTINUED | OUTPATIENT
Start: 2024-12-16 | End: 2024-12-16 | Stop reason: SDUPTHER

## 2024-12-16 RX ORDER — ACETAMINOPHEN 500 MG
1000 TABLET ORAL ONCE
Status: COMPLETED | OUTPATIENT
Start: 2024-12-16 | End: 2024-12-16

## 2024-12-16 RX ORDER — ALBUTEROL SULFATE 90 UG/1
2 INHALANT RESPIRATORY (INHALATION) EVERY 6 HOURS PRN
Status: DISCONTINUED | OUTPATIENT
Start: 2024-12-16 | End: 2024-12-16 | Stop reason: CLARIF

## 2024-12-16 RX ORDER — SODIUM CHLORIDE 9 MG/ML
INJECTION, SOLUTION INTRAVENOUS
Status: DISCONTINUED | OUTPATIENT
Start: 2024-12-16 | End: 2024-12-16 | Stop reason: SDUPTHER

## 2024-12-16 RX ORDER — PANTOPRAZOLE SODIUM 40 MG/1
40 TABLET, DELAYED RELEASE ORAL
Status: DISCONTINUED | OUTPATIENT
Start: 2024-12-17 | End: 2024-12-16

## 2024-12-16 RX ORDER — DIPHENHYDRAMINE HCL 25 MG
25 TABLET ORAL EVERY 6 HOURS PRN
Status: DISCONTINUED | OUTPATIENT
Start: 2024-12-16 | End: 2024-12-17 | Stop reason: HOSPADM

## 2024-12-16 RX ORDER — ROPIVACAINE HYDROCHLORIDE 5 MG/ML
30 INJECTION, SOLUTION EPIDURAL; INFILTRATION; PERINEURAL
Status: DISCONTINUED | OUTPATIENT
Start: 2024-12-16 | End: 2024-12-16 | Stop reason: HOSPADM

## 2024-12-16 RX ADMIN — FENTANYL CITRATE 50 MCG: 50 INJECTION, SOLUTION INTRAMUSCULAR; INTRAVENOUS at 12:56

## 2024-12-16 RX ADMIN — PROPOFOL 180 MG: 10 INJECTION, EMULSION INTRAVENOUS at 12:56

## 2024-12-16 RX ADMIN — ACETAMINOPHEN 650 MG: 325 TABLET ORAL at 23:08

## 2024-12-16 RX ADMIN — WATER 2000 MG: 1 INJECTION INTRAMUSCULAR; INTRAVENOUS; SUBCUTANEOUS at 20:35

## 2024-12-16 RX ADMIN — ACETAMINOPHEN 650 MG: 325 TABLET ORAL at 18:25

## 2024-12-16 RX ADMIN — SUGAMMADEX 200 MG: 100 INJECTION, SOLUTION INTRAVENOUS at 15:50

## 2024-12-16 RX ADMIN — DEXAMETHASONE SODIUM PHOSPHATE 8 MG: 10 INJECTION, SOLUTION INTRAMUSCULAR; INTRAVENOUS at 12:59

## 2024-12-16 RX ADMIN — HYDROMORPHONE HYDROCHLORIDE 0.5 MG: 1 INJECTION, SOLUTION INTRAMUSCULAR; INTRAVENOUS; SUBCUTANEOUS at 16:27

## 2024-12-16 RX ADMIN — SODIUM CHLORIDE: 9 INJECTION, SOLUTION INTRAVENOUS at 14:59

## 2024-12-16 RX ADMIN — ROCURONIUM BROMIDE 10 MG: 10 INJECTION, SOLUTION INTRAVENOUS at 13:51

## 2024-12-16 RX ADMIN — FENTANYL CITRATE 50 MCG: 50 INJECTION, SOLUTION INTRAMUSCULAR; INTRAVENOUS at 12:19

## 2024-12-16 RX ADMIN — ROCURONIUM BROMIDE 10 MG: 10 INJECTION, SOLUTION INTRAVENOUS at 14:39

## 2024-12-16 RX ADMIN — TRANEXAMIC ACID 1000 MG: 10 INJECTION, SOLUTION INTRAVENOUS at 16:47

## 2024-12-16 RX ADMIN — SODIUM CHLORIDE: 9 INJECTION, SOLUTION INTRAVENOUS at 12:14

## 2024-12-16 RX ADMIN — ROCURONIUM BROMIDE 50 MG: 10 INJECTION, SOLUTION INTRAVENOUS at 12:56

## 2024-12-16 RX ADMIN — DULOXETINE HYDROCHLORIDE 30 MG: 30 CAPSULE, DELAYED RELEASE ORAL at 20:34

## 2024-12-16 RX ADMIN — HYDROMORPHONE HYDROCHLORIDE 0.5 MG: 1 INJECTION, SOLUTION INTRAMUSCULAR; INTRAVENOUS; SUBCUTANEOUS at 16:28

## 2024-12-16 RX ADMIN — ONDANSETRON 4 MG: 2 INJECTION, SOLUTION INTRAMUSCULAR; INTRAVENOUS at 14:59

## 2024-12-16 RX ADMIN — ACETAMINOPHEN 1000 MG: 500 TABLET ORAL at 11:42

## 2024-12-16 RX ADMIN — HYDROMORPHONE HYDROCHLORIDE 0.5 MG: 1 INJECTION, SOLUTION INTRAMUSCULAR; INTRAVENOUS; SUBCUTANEOUS at 16:44

## 2024-12-16 RX ADMIN — SODIUM CHLORIDE: 9 INJECTION, SOLUTION INTRAVENOUS at 18:30

## 2024-12-16 RX ADMIN — FENTANYL CITRATE 50 MCG: 50 INJECTION, SOLUTION INTRAMUSCULAR; INTRAVENOUS at 13:25

## 2024-12-16 RX ADMIN — LIDOCAINE HYDROCHLORIDE 100 MG: 20 INJECTION, SOLUTION EPIDURAL; INFILTRATION; INTRACAUDAL; PERINEURAL at 12:56

## 2024-12-16 RX ADMIN — OXYCODONE 10 MG: 5 TABLET ORAL at 18:25

## 2024-12-16 RX ADMIN — MIDAZOLAM HYDROCHLORIDE 1 MG: 1 INJECTION, SOLUTION INTRAMUSCULAR; INTRAVENOUS at 12:19

## 2024-12-16 RX ADMIN — TRANEXAMIC ACID 1000 MG: 10 INJECTION, SOLUTION INTRAVENOUS at 12:56

## 2024-12-16 RX ADMIN — OXYCODONE 10 MG: 5 TABLET ORAL at 23:08

## 2024-12-16 RX ADMIN — SODIUM CHLORIDE, PRESERVATIVE FREE 10 ML: 5 INJECTION INTRAVENOUS at 20:35

## 2024-12-16 RX ADMIN — WATER 2000 MG: 1 INJECTION INTRAMUSCULAR; INTRAVENOUS; SUBCUTANEOUS at 12:56

## 2024-12-16 ASSESSMENT — PAIN DESCRIPTION - ORIENTATION
ORIENTATION: RIGHT

## 2024-12-16 ASSESSMENT — PAIN SCALES - GENERAL
PAINLEVEL_OUTOF10: 10
PAINLEVEL_OUTOF10: 3
PAINLEVEL_OUTOF10: 4
PAINLEVEL_OUTOF10: 10
PAINLEVEL_OUTOF10: 5
PAINLEVEL_OUTOF10: 10
PAINLEVEL_OUTOF10: 10

## 2024-12-16 ASSESSMENT — PAIN - FUNCTIONAL ASSESSMENT
PAIN_FUNCTIONAL_ASSESSMENT: PREVENTS OR INTERFERES SOME ACTIVE ACTIVITIES AND ADLS
PAIN_FUNCTIONAL_ASSESSMENT: PREVENTS OR INTERFERES SOME ACTIVE ACTIVITIES AND ADLS
PAIN_FUNCTIONAL_ASSESSMENT: 0-10
PAIN_FUNCTIONAL_ASSESSMENT: ACTIVITIES ARE NOT PREVENTED

## 2024-12-16 ASSESSMENT — PAIN DESCRIPTION - DESCRIPTORS
DESCRIPTORS: DISCOMFORT
DESCRIPTORS: ACHING
DESCRIPTORS: ACHING;DISCOMFORT;SORE
DESCRIPTORS: DISCOMFORT
DESCRIPTORS: ACHING;THROBBING
DESCRIPTORS: ACHING

## 2024-12-16 ASSESSMENT — PAIN DESCRIPTION - LOCATION
LOCATION: KNEE

## 2024-12-16 NOTE — BRIEF OP NOTE
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]    Post-Op Diagnosis: Same       Procedure(s):  REVISION RIGHT TOTAL KNEE ARTHROPLASTY WITH FLUOROSCOPY    Surgeon(s):  Guido Baird MD    Assistant:  Surgical Assistant: Raza Duran RN    Anesthesia: General    Estimated Blood Loss (mL): 100    Complications: None    Specimens:   ID Type Source Tests Collected by Time Destination   1 : Synovial fluid culture Body Fluid Synovial CULTURE, ANAEROBIC, GRAM STAIN, CULTURE, SURGICAL Guido Baird MD 12/16/2024 1327    2 : RIGHT LATERAL KNEE TISSUE CULTURE Tissue Tissue CULTURE, ANAEROBIC, GRAM STAIN, CULTURE, SURGICAL Guido Baird MD 12/16/2024 1330    3 : RIGHT MEDIAL KNEE TISSUE CULTURE Tissue Tissue CULTURE, ANAEROBIC, GRAM STAIN, CULTURE, SURGICAL Guido Baird MD 12/16/2024 1351    4 : RIGHT ANTERIOR KNEE TISSUE CULTURE Tissue Tissue CULTURE, ANAEROBIC, GRAM STAIN, CULTURE, SURGICAL Guido Baird MD 12/16/2024 1352    A : RETAINED HARDWARE RIGHT KNEE Hardware Hardware SURGICAL PATHOLOGY Guido Baird MD 12/16/2024 1510        Implants:  Implant Name Type Inv. Item Serial No.  Lot No. LRB No. Used Action   COMPONENT FEM NANETTE 7 STD RT KNEE REV COCR PERSONA - [id number]  COMPONENT FEM NANETTE 7 STD RT KNEE REV COCR PERSONA  [employer]- [id number] Right 1 Implanted   COMPONENT FEM AUG 7 5 MM POST PERSONA - [id number]  COMPONENT FEM AUG 7 5 MM POST PERSONA  [employer]-WD [id number] Right 1 Implanted   COMPONENT FEM AUG 7 5 MM DSTL PERSONA - [id number]  COMPONENT FEM AUG 7 5 MM DSTL PERSONA  [employer]WD [id number] Right 1 Implanted   COMPONENT FEM AUG 7 5 MM POST PERSONA - [id number]  COMPONENT FEM AUG 7 5 MM POST PERSONA  [employer]WD [id number] Right 1 Implanted

## 2024-12-16 NOTE — ANESTHESIA POSTPROCEDURE EVALUATION
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    Francisco Phase I: Francisco Score: 10    Francisco Phase II:      Anesthesia Post Evaluation    Patient location during evaluation: PACU  Patient participation: complete - patient participated  Level of consciousness: awake  Pain score: 0  Airway patency: patent  Nausea & Vomiting: no nausea and no vomiting  Cardiovascular status: blood pressure returned to baseline and hemodynamically stable  Respiratory status: acceptable  Hydration status: euvolemic  Pain management: adequate        No notable events documented.

## 2024-12-16 NOTE — OP NOTE
12X135 MM KNEE REV PERSONA - [id number]  EXTENSION STEM 12X135 MM KNEE REV PERSONA  [employer]- [id number] Right 1 Implanted   PSN REV STRAIGHT SPLINE STEM EXT [id number] - [id number]  PSN REV STRAIGHT SPLINE STEM EXT [id number]  [employer]- [id number] Right 1 Implanted   PSN REV TIB FIXED KEEL CMT SZ D R - [id number]  PSN REV TIB FIXED KEEL CMT SZ D R  [employer]- [id number] Right 1 Implanted   CEMENT BNE 40GM HI VISC RADPQ FOR REV SURG - [id number]  CEMENT BNE 40GM HI VISC RADPQ FOR REV SURG  [employer]- [id number] Right 2 Implanted   PSN REV CCK ART SURF VE 12MM R 7-9+ CD - [id number]  PSN REV CCK ART SURF VE 12MM R 7-9+ CD  [employer]- [id number] Right 1 Implanted         Drains:   Negative Pressure Wound Therapy Knee Right (Active)   Dressing Status Intact w/seal 12/16/24 1552   Canister changed? Yes 12/16/24 1552   Unit Type PREVENA 12/16/24 1552       [REMOVED] Urinary Catheter 12/16/24 Nieto (Removed)       Findings:  Infection Present At Time Of Surgery (PATOS) (choose all levels that have infection present):  No infection present    Detailed Description of Procedure:   Implants: Brittaney Persona Revision 7 femoral component with 5mm augments distal lateral, posterior medial, and posterior lateral with a 12x135 splined stem, Persona revision D tibial component with a 10x135 splined stem, 12 mm CCK polyethylene, 2 bags of Biomet bone cement     Indications for Procedure: Patient is a 75yo F who initially presented with a painful total knee arthroplasty. Radiographs revealed a broken tibial component and infection workup was negative. After an extensive course of nonoperative treatment, patient continued to have intolerable symptoms that were significantly affecting his quality of life. After several discussions about risks, benefits, and alternatives to both nonoperative and operative treatment options, the patient elected to proceed

## 2024-12-16 NOTE — ANESTHESIA PROCEDURE NOTES
Peripheral Block    Patient location during procedure: procedure area  Reason for block: post-op pain management and at surgeon's request  Start time: 12/16/2024 12:20 PM  End time: 12/16/2024 12:25 PM  Staffing  Performed: anesthesiologist   Anesthesiologist: Alice Montes DO  Performed by: Alice Montes DO  Authorized by: Alice Montes DO    Preanesthetic Checklist  Completed: patient identified, IV checked, site marked, risks and benefits discussed, surgical/procedural consents, equipment checked, pre-op evaluation, timeout performed, anesthesia consent given, oxygen available and monitors applied/VS acknowledged  Peripheral Block   Patient position: supine  Prep: ChloraPrep  Provider prep: mask and sterile gloves  Patient monitoring: cardiac monitor, frequent blood pressure checks and IV access  Block type: Femoral  Adductor canal  Laterality: right  Injection technique: single-shot  Guidance: ultrasound guided  Local infiltration: ropivacaine  Infiltration strength: 0.5 %  Local infiltration: ropivacaine  Dose: 30 mL    Needle   Needle type: combined needle/nerve stimulator   Needle gauge: 22 G  Needle localization: ultrasound guidance  Needle length: 10 cm  Assessment   Injection assessment: negative aspiration for heme, no paresthesia on injection, local visualized surrounding nerve on ultrasound and no intravascular symptoms  Paresthesia pain: none  Slow fractionated injection: yes  Hemodynamics: stable  Outcomes: patient tolerated procedure well and uncomplicated

## 2024-12-16 NOTE — H&P
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 female who presents for REVISION RIGHT TOTAL KNEE ARTHROPLASTY        +++ACB+++            +++BRITTANEY+++    See office notes for further details pre-operative course.    Past Medical History:   Diagnosis Date    Arthritis     Asthma     CAD (coronary artery disease)     History of blood transfusion     Hyperlipidemia     Hypertension        Past Surgical History:   Procedure Laterality Date    APPENDECTOMY      BREAST SURGERY      COLONOSCOPY      CORONARY ARTERY BYPASS GRAFT      HYSTERECTOMY (CERVIX STATUS UNKNOWN)      KNEE SURGERY         Medications:  Patient   Current Facility-Administered Medications   Medication Dose Route Frequency Provider Last Rate Last Admin    dexAMETHasone (PF) (DECADRON) injection 8 mg  8 mg IntraVENous Once Guido Baird MD        sodium chloride flush 0.9 % injection 5-40 mL  5-40 mL IntraVENous 2 times per day Guido Baird MD        sodium chloride flush 0.9 % injection 5-40 mL  5-40 mL IntraVENous PRN Guido Baird MD        0.9 % sodium chloride infusion   IntraVENous PRN Guido Baird MD        ceFAZolin (ANCEF) 2,000 mg in sterile water 20 mL IV syringe  2,000 mg IntraVENous On Call to OR Guido Baird MD        tranexamic acid-NaCl IVPB premix 1,000 mg  1,000 mg IntraVENous On Call to OR Guido Baird MD        tranexamic acid-NaCl IVPB premix 1,000 mg  1,000 mg IntraVENous Once PRN Guido Baird MD        midazolam PF (VERSED) injection 0.5 mg  0.5 mg IntraVENous PRN Blake Teranren T, DO        fentaNYL (SUBLIMAZE) injection 25 mcg  25 mcg IntraVENous PRN Parul Kaiden T, DO        ROPivacaine (NAROPIN) 0.5% injection 30 mL  30 mL Infiltration Once PRN Parul, Kaiden T, DO         Facility-Administered Medications Ordered in Other Encounters   Medication Dose Route Frequency Provider Last Rate Last Admin    0.9 % sodium chloride

## 2024-12-17 VITALS
RESPIRATION RATE: 20 BRPM | HEART RATE: 81 BPM | TEMPERATURE: 97.7 F | BODY MASS INDEX: 26.82 KG/M2 | WEIGHT: 161 LBS | DIASTOLIC BLOOD PRESSURE: 59 MMHG | OXYGEN SATURATION: 98 % | HEIGHT: 65 IN | SYSTOLIC BLOOD PRESSURE: 118 MMHG

## 2024-12-17 LAB
ANION GAP SERPL CALCULATED.3IONS-SCNC: 11 MMOL/L (ref 7–16)
BUN SERPL-MCNC: 11 MG/DL (ref 6–23)
CALCIUM SERPL-MCNC: 9 MG/DL (ref 8.6–10.2)
CHLORIDE SERPL-SCNC: 103 MMOL/L (ref 98–107)
CO2 SERPL-SCNC: 25 MMOL/L (ref 22–29)
CREAT SERPL-MCNC: 0.9 MG/DL (ref 0.5–1)
ERYTHROCYTE [DISTWIDTH] IN BLOOD BY AUTOMATED COUNT: 12.5 % (ref 11.5–15)
GFR, ESTIMATED: 64 ML/MIN/1.73M2
GLUCOSE SERPL-MCNC: 134 MG/DL (ref 74–99)
HCT VFR BLD AUTO: 35.8 % (ref 34–48)
HGB BLD-MCNC: 11.4 G/DL (ref 11.5–15.5)
MCH RBC QN AUTO: 31.4 PG (ref 26–35)
MCHC RBC AUTO-ENTMCNC: 31.8 G/DL (ref 32–34.5)
MCV RBC AUTO: 98.6 FL (ref 80–99.9)
PLATELET # BLD AUTO: 238 K/UL (ref 130–450)
PMV BLD AUTO: 9.5 FL (ref 7–12)
POTASSIUM SERPL-SCNC: 4.4 MMOL/L (ref 3.5–5)
RBC # BLD AUTO: 3.63 M/UL (ref 3.5–5.5)
SODIUM SERPL-SCNC: 139 MMOL/L (ref 132–146)
WBC OTHER # BLD: 9.7 K/UL (ref 4.5–11.5)

## 2024-12-17 PROCEDURE — 80048 BASIC METABOLIC PNL TOTAL CA: CPT

## 2024-12-17 PROCEDURE — 2580000003 HC RX 258: Performed by: STUDENT IN AN ORGANIZED HEALTH CARE EDUCATION/TRAINING PROGRAM

## 2024-12-17 PROCEDURE — 6370000000 HC RX 637 (ALT 250 FOR IP): Performed by: INTERNAL MEDICINE

## 2024-12-17 PROCEDURE — 85027 COMPLETE CBC AUTOMATED: CPT

## 2024-12-17 PROCEDURE — 6370000000 HC RX 637 (ALT 250 FOR IP): Performed by: STUDENT IN AN ORGANIZED HEALTH CARE EDUCATION/TRAINING PROGRAM

## 2024-12-17 PROCEDURE — 88300 SURGICAL PATH GROSS: CPT

## 2024-12-17 PROCEDURE — G0378 HOSPITAL OBSERVATION PER HR: HCPCS

## 2024-12-17 PROCEDURE — 97161 PT EVAL LOW COMPLEX 20 MIN: CPT

## 2024-12-17 PROCEDURE — 6360000002 HC RX W HCPCS: Performed by: STUDENT IN AN ORGANIZED HEALTH CARE EDUCATION/TRAINING PROGRAM

## 2024-12-17 PROCEDURE — 1200000000 HC SEMI PRIVATE

## 2024-12-17 PROCEDURE — 36415 COLL VENOUS BLD VENIPUNCTURE: CPT

## 2024-12-17 PROCEDURE — 97530 THERAPEUTIC ACTIVITIES: CPT

## 2024-12-17 PROCEDURE — 97165 OT EVAL LOW COMPLEX 30 MIN: CPT

## 2024-12-17 RX ORDER — ASPIRIN 81 MG/1
81 TABLET ORAL 2 TIMES DAILY
Qty: 60 TABLET | Refills: 0 | Status: SHIPPED | OUTPATIENT
Start: 2024-12-17 | End: 2025-01-16

## 2024-12-17 RX ORDER — MELOXICAM 15 MG/1
15 TABLET ORAL DAILY
Qty: 30 TABLET | Refills: 1 | Status: SHIPPED | OUTPATIENT
Start: 2024-12-17

## 2024-12-17 RX ORDER — DOXYCYCLINE HYCLATE 100 MG
100 TABLET ORAL 2 TIMES DAILY
Qty: 28 TABLET | Refills: 0 | Status: SHIPPED | OUTPATIENT
Start: 2024-12-17 | End: 2024-12-31

## 2024-12-17 RX ORDER — OXYCODONE HYDROCHLORIDE 5 MG/1
5-10 TABLET ORAL EVERY 4 HOURS PRN
Qty: 60 TABLET | Refills: 0 | Status: SHIPPED | OUTPATIENT
Start: 2024-12-17 | End: 2024-12-24

## 2024-12-17 RX ORDER — SENNOSIDES 8.6 MG
1 TABLET ORAL 2 TIMES DAILY
Qty: 70 TABLET | Refills: 0 | Status: SHIPPED | OUTPATIENT
Start: 2024-12-17

## 2024-12-17 RX ORDER — ZOLPIDEM TARTRATE 5 MG/1
10 TABLET ORAL NIGHTLY PRN
Status: DISCONTINUED | OUTPATIENT
Start: 2024-12-17 | End: 2024-12-17 | Stop reason: HOSPADM

## 2024-12-17 RX ORDER — ACETAMINOPHEN 500 MG
1000 TABLET ORAL EVERY 8 HOURS PRN
Qty: 90 TABLET | Refills: 1 | Status: SHIPPED | OUTPATIENT
Start: 2024-12-17

## 2024-12-17 RX ADMIN — AMLODIPINE BESYLATE 5 MG: 5 TABLET ORAL at 08:13

## 2024-12-17 RX ADMIN — EZETIMIBE 10 MG: 10 TABLET ORAL at 08:13

## 2024-12-17 RX ADMIN — OXYCODONE 10 MG: 5 TABLET ORAL at 08:12

## 2024-12-17 RX ADMIN — OXYCODONE 10 MG: 5 TABLET ORAL at 03:20

## 2024-12-17 RX ADMIN — WATER 2000 MG: 1 INJECTION INTRAMUSCULAR; INTRAVENOUS; SUBCUTANEOUS at 05:00

## 2024-12-17 RX ADMIN — ASPIRIN 81 MG: 81 TABLET, COATED ORAL at 08:13

## 2024-12-17 RX ADMIN — ACETAMINOPHEN 650 MG: 325 TABLET ORAL at 12:18

## 2024-12-17 RX ADMIN — PANTOPRAZOLE SODIUM 40 MG: 40 TABLET, DELAYED RELEASE ORAL at 05:00

## 2024-12-17 RX ADMIN — CLOPIDOGREL BISULFATE 75 MG: 75 TABLET ORAL at 08:13

## 2024-12-17 RX ADMIN — OXYCODONE 10 MG: 5 TABLET ORAL at 12:17

## 2024-12-17 ASSESSMENT — PAIN DESCRIPTION - DESCRIPTORS
DESCRIPTORS: ACHING;THROBBING;DISCOMFORT
DESCRIPTORS: ACHING;DISCOMFORT;SORE
DESCRIPTORS: ACHING;THROBBING;SORE

## 2024-12-17 ASSESSMENT — PAIN DESCRIPTION - LOCATION
LOCATION: KNEE

## 2024-12-17 ASSESSMENT — PAIN SCALES - GENERAL
PAINLEVEL_OUTOF10: 5
PAINLEVEL_OUTOF10: 8
PAINLEVEL_OUTOF10: 6
PAINLEVEL_OUTOF10: 8
PAINLEVEL_OUTOF10: 3
PAINLEVEL_OUTOF10: 9

## 2024-12-17 ASSESSMENT — PAIN DESCRIPTION - ORIENTATION
ORIENTATION: RIGHT

## 2024-12-17 ASSESSMENT — PAIN SCALES - WONG BAKER
WONGBAKER_NUMERICALRESPONSE: HURTS A LITTLE BIT
WONGBAKER_NUMERICALRESPONSE: HURTS A LITTLE BIT

## 2024-12-17 ASSESSMENT — PAIN - FUNCTIONAL ASSESSMENT
PAIN_FUNCTIONAL_ASSESSMENT: PREVENTS OR INTERFERES SOME ACTIVE ACTIVITIES AND ADLS
PAIN_FUNCTIONAL_ASSESSMENT: PREVENTS OR INTERFERES WITH MANY ACTIVE NOT PASSIVE ACTIVITIES

## 2024-12-17 NOTE — PROGRESS NOTES
4 Eyes Skin Assessment     NAME:  Eufemia Velasquez  YOB: 1950  MEDICAL RECORD NUMBER:  [id number]    The patient is being assessed for  Admission    I agree that at least one RN has performed a thorough Head to Toe Skin Assessment on the patient. ALL assessment sites listed below have been assessed.      Areas assessed by both nurses:    Head, Face, Ears, Shoulders, Back, Chest, Arms, Elbows, Hands, Sacrum. Buttock, Coccyx, Ischium, Legs. Feet and Heels, and Under Medical Devices         Does the Patient have a Wound? No noted wound(s)       R TOTAL KNEE    Bryn Prevention initiated by RN: Yes  Wound Care Orders initiated by RN: No    Pressure Injury (Stage 3,4, Unstageable, DTI, NWPT, and Complex wounds) if present, place Wound referral order by RN under : No    New Ostomies, if present place, Ostomy referral order under : No     Nurse 1 eSignature: Electronically signed by Bijan Vernon RN on 12/16/24 at 6:57 PM EST    **SHARE this note so that the co-signing nurse can place an eSignature**    Nurse 2 eSignature: Electronically signed by Roscoe Cox RN on 12/16/24 at 6:59 PM EST   
CLINICAL PHARMACY NOTE: MEDS TO BEDS    Total # of Prescriptions Filled: 5   The following medications were delivered to the patient:  OXYCODONE 5  SENNA  DOXYCYCLINE 100  MELOXICAM 15  PAIN RELIEF 500  ASPIRN 81    Additional Documentation: OXYCODONE   
Dr. Lang notified of [facility] consult.  
Medical clearance not on chart. Voicemail left at Dr. Baird office.  
Orthopedic Surgery Progress Note    Subjective:  Patient doing well this morning. No acute issues overnight. Pain well controlled. Tolerating PO diet. Voiding spontaneously. Denies numbness/tingling.    Objective:  Vitals:    12/17/24 0812   BP:    Pulse:    Resp: 18   Temp:    SpO2:      Lab Results   Component Value Date    WBC 9.7 12/17/2024    HGB 11.4 (L) 12/17/2024    HCT 35.8 12/17/2024    MCV 98.6 12/17/2024     12/17/2024     Lab Results   Component Value Date/Time     12/17/2024 04:25 AM    K 4.4 12/17/2024 04:25 AM     12/17/2024 04:25 AM    CO2 25 12/17/2024 04:25 AM    BUN 11 12/17/2024 04:25 AM    CREATININE 0.9 12/17/2024 04:25 AM    GLUCOSE 134 12/17/2024 04:25 AM    CALCIUM 9.0 12/17/2024 04:25 AM    LABGLOM 64 12/17/2024 04:25 AM          Exam:  General - awake and alert, lying in bed    Right Lower Extremity:  Dressing clean, dry, and intact  Prevena in place with good suction, canister empty  Fires quad  Plantarflexion/dorsiflexion intact  SILT in all nerve distributions throughout the foot  Cap refill < 2 sec    Imaging:  Xray Result (most recent):  XR KNEE RIGHT (1-2 VIEWS) 12/16/2024    Narrative  EXAMINATION:  SPOT FLUOROSCOPIC IMAGES; RIGHT KNEE FOUR VIEWS    12/16/2024 4:15 pm    TECHNIQUE:  Fluoroscopy was provided by the radiology department for procedure.  Radiologist was not present during examination.      FLUOROSCOPY DOSE AND TYPE:    Radiation Exposure Index: Kerma mGy, 0.67 mGy.  Total fluoroscopic time: 11.3  seconds.  Total number of images: 3    COMPARISON:  None    HISTORY:  ORDERING SYSTEM PROVIDED HISTORY: REVISION RIGHT TOTAL KNEE ARTHROPLASTY  TECHNOLOGIST PROVIDED HISTORY:  Reason for exam:->REVISION RIGHT TOTAL KNEE ARTHROPLASTY    Intraprocedural imaging.    FINDINGS:  FLUOROSCOPY:    Spot intraoperative images are obtained demonstrating metallic ruby in the  proximal tibia without evidence of complications.  Postop changes in the soft  tissues 
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  Past Medical History:  has a past medical history of Arthritis, Asthma, CAD (coronary artery disease), History of blood transfusion, Hyperlipidemia, and Hypertension.  Past Surgical History:  has a past surgical history that includes Coronary artery bypass graft; knee surgery; Appendectomy; Hysterectomy; Breast surgery; Colonoscopy; and Revision total knee arthroplasty (Right, 2024).        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 S/p revision R TKA      PRECAUTIONS: falls, WBAT, wound vac      Social:  Pt lives alone  in a  1  floor plan  3  steps and  1  rails to enter.  Prior to admission pt walked with  no AD.        Initial Evaluation  Date:  2024  Treatment     PM   Short Term/ Long Term   Goals   Was pt agreeable to Eval/treatment?  Yes   yes      Does pt have pain?  R knee    R knee      Bed Mobility  Rolling:  NT   Supine to sit: SBA   Sit to supine:  NT   Scooting:  independent in sit   sit to supine : S/I  Independent    Transfers Sit to stand:  SBA   Stand to sit:  SBA   Stand pivot:  NT   sit <> stand : Supervision   Independent    Ambulation     50  feet with  ww  with  SBA   120  and 30 feet with ww S/SBA  150  feet with  ww  with  independent          Stair negotiation: ascended and descended NT   4 steps x 2 with B HRs SBA   4  steps with  1  rail with  SBA    LE ROM  AAROM R knee 5-80 degrees        LE strength  3-/ 5 R knee        AM- PAC RAW score    
Renal Dose Adjustment Policy (Generic)     This patient is on medication that requires renal, weight, and/or indication dose adjustment.      Date Body Weight IBW  Adjusted BW SCr  CrCl Dialysis status   12/16/2024 73 kg (161 lb) Ideal body weight: 57 kg (125 lb 10.6 oz)  Adjusted ideal body weight: 63.4 kg (139 lb 12.8 oz) Serum creatinine: 1 mg/dL 12/04/24 1317  Estimated creatinine clearance: 48 mL/min N/a       Pharmacy has dose-adjusted the following medication(s):    Date Previous Order Adjusted Order   12/16/2024 Famotidine 20 mg IV/PO   twice daily Famotidine 20 mg IV/PO daily       These changes were made per protocol according to the [facility]   Automatic Renal Dose Adjustment Policy.     *Please note this dose may need readjusted if patient's condition changes.    Please contact pharmacy with any questions regarding these changes.    damion arana RPH  12/16/2024  6:01 PM    
displays functional ability as noted in the objective portion of this evaluation.        Conditions Requiring Skilled Therapeutic Intervention:    [x]Decreased strength     [x]Decreased ROM  [x]Decreased functional mobility  [x]Decreased balance   [x]Decreased endurance   [x]Decreased posture  []Decreased sensation  []Decreased coordination   []Decreased vision  []Decreased safety awareness   [x]Increased pain         Comments:   Pt  in bed  upon arrival ; agreeable to PT. Instructed in APs, QS, and GS.  Mobility as above. Pt required increased time with mobility . Limited due to pain     Treatment:  Pt was instructed on the following :   -Bed mobility : including sequencing and technique  -Transfers: hand and foot placement, controlled movement with stand to sit  - Gait: proper use of w , sequencing, and posture . Needs cues to stay proper distance from ww  and for reciprocal gait            Pt educated on fall risk, safety with mobility        Patient response to education:   Pt verbalized understanding Pt demonstrated skill Pt requires further education in this area   x  Needs cues   x       Comments:  Pt left  in chair after session, with call light in reach.      Rehab potential is Good for reaching above PT goals.    Pt’s/ family goals   1. Decreased pain     Patient and or family understand(s) diagnosis, prognosis, and plan of care. -  yes     PLAN  PT care will be provided in accordance with the objectives noted above.  Whenever appropriate, clear delegation orders will be provided for nursing staff.  Exercises and functional mobility practice will be used as well as appropriate assistive devices or modalities to obtain goals. Patient and family education will also be administered as needed.        PLAN OF CARE:    Current Treatment Recommendations     [x] Strengthening to improve independence with functional mobility   [x] ROM to improve independence with functional mobility   [x] Balance Training to 
exercise to improve tolerance and functional strength for ADLs    Balance retraining/tolerance tasks for facilitation of postural control with dynamic challenges during ADLs .      Positioning to improve functional independence       Recommended Adaptive Equipment: wheeled walker     Home Living: Pt lives alone , ranch, steps to enter    sons in area.  Son  (from [place]) staying with patient till Friday   Bathroom setup: walk in shower , shower seat      Prior Level of Function: Independent with ADLs , and  with IADLs; ambulated with no device  Driving: yes   Occupation:  at [employer]     Pain Level: knee pain ;   Cognition: A&O: 4/4;    Memory:  good    Sequencing:  good    Problem solving:  good    Judgement/safety:  good      Functional Assessment:  AM-PAC Daily Activity Raw Score: 17/24   Initial Eval Status  Date: 12/17/2024 Treatment Status  Date: STGs = LTGs  Time frame: 10-14 days   Feeding Independent     Grooming Set-up   Independent   UB Dressing Set-up   Independent   LB Dressing Min A  Able to bend to reach feet to thread pants on and doff socks    Unable to don shoes , too tight with ace wrap on R LE   Left hospital socks on   Has reacher at home,  shoe horn issued   Mod I    Bathing Min A   Mod I    Toileting Supervision   Independent   Bed Mobility  Up in chair   Independent   Functional Transfers SBA   Sit -stand from chair   Mod I    Functional Mobility SBA , w/walker   Ambulating in room   Mod I  with good tolerance    Balance Sitting:     Static:  Independent    Dynamic:Independent  Standing: SBA   Independent   Activity Tolerance No SOB observed   Good  with ADL activity    Visual/  Perceptual Glasses: none by bedside        UE ROM/strength  WFLs       Hand Dominance right    Hearing: WFL   Sensation:  No c/o numbness or tingling   Tone: WFL   Edema: none observed     Comments: Upon arrival patient sitting in chair .  At end of session, patient returned to chair with call light and 
  acyclovir (ZOVIRAX) 200 MG capsule  3/4/22  Yes Sharonda Vail MD   benzonatate (TESSALON) 100 MG capsule TAKE ONE (1) CAPSULE BY MOUTH EVERY 8 HOURS IF NEEDED FOR COUGH 1/11/22  Yes ProviderSharonda MD   docusate sodium (COLACE) 100 MG capsule Take 1 capsule by mouth 2 times daily   Yes Sharonda Vail MD   azelastine (ASTELIN) 0.1 % nasal spray 2 sprays by Nasal route 2 times daily Use in each nostril as directed 2/9/21  Yes Minerva Whitaker, DO   Omega-3 Fatty Acids (FISH OIL) 1200 MG CPDR Take by mouth   Yes ProviderSharonda MD   cetirizine (ZYRTEC) 10 MG tablet Take 1 tablet by mouth nightly   Yes ProviderSharonda MD   ZETIA 10 MG tablet  8/5/15  Yes ProviderSharonda MD   clopidogrel (PLAVIX) 75 MG tablet Take 1 tablet by mouth daily   Yes ProviderSharonda MD   albuterol (PROAIR HFA) 108 (90 BASE) MCG/ACT inhaler Inhale 2 puffs into the lungs every 6 hours as needed for Wheezing   Yes ProviderSharonda MD   zolpidem (AMBIEN) 10 MG tablet Take 1 tablet by mouth nightly as needed for Sleep.   Yes ProviderSharonda MD   montelukast (SINGULAIR) 10 MG tablet Take 1 tablet by mouth Daily   Yes Provider, MD Sharonda       Allergies  Allergies   Allergen Reactions    Amoxicillin-Pot Clavulanate     Biaxin [Clarithromycin]     Keflex [Cephalexin]     Lipitor     Lovastatin     Macrodantin [Nitrofurantoin Macrocrystal]     Sulfa Antibiotics     Asa Arthritis Strength-Antacid [Aspirin Buff, Al Hyd-Mg Hyd]     Levofloxacin Nausea And Vomiting and Other (See Comments)     Upset stomach       Review of Systems:   Please see HPI above. All bolded are positive. All un-bolded are negative.  Constitutional Symptoms: fever, chills, fatigue, generalized weakness, diaphoresis, increase in thirst, loss of appetite  Eyes: vision change   Ears, Nose, Mouth, Throat: hearing loss, nasal congestion, sores in the mouth  Cardiovascular: chest pain, chest heaviness, palpitations  Respiratory:

## 2024-12-17 NOTE — DISCHARGE INSTRUCTIONS
Discharge Instructions    Recovery after Joint Replacement Surgery     In the first 1-2 weeks after a joint replacement, it is normal to have more pain, swelling, and redness than were present before surgery. Please follow these instructions to limit the symptoms during the first two weeks:     Ice: Please apply ice to joint keeping a towel in between the ice and your skin. It is ok to ice 30 min at a time and then take 30 min break. It is ok to use heat immediately before you exercise or walk.     Elevation: Please always have the leg elevated on a recliner or footrest while resting. The leg should be at the level of your waist or higher.     Medication: Please take the medication as prescribed. If prescribed Tylenol and Celebrex (or Meloxicam), please take these around the clock for at least two weeks, but in most cases I recommend taking for 4-6 weeks.  Most patients will need Oxycodone every four to six hours for the first 1-2 weeks. Some patients will also be prescribed a muscle spasm relaxer (Cyclobenzaprine) to take as needed.  Pain Meds are prescribed for 5-7 days at a time, so call on the day before the refill is needed. Please try to call before 3 pm so the prescription can be refilled before the next work day.   Refills are prescribed Monday through Friday. We are unable to prescribe on weekends, holidays, or nights. If your prescription will run out on the weekend, please call before then.     Patient can shower with bandage but must only allow a small amount of water on bandage. Patient should remove bandage 7 days after surgery. After removing bandage, if there is drainage from the incision then please contact the Doctor. Patient may continue to shower after removing the bandage if incision clean, dry, and without drainage. Most incisions are closed with skin glue which will appear shiny and can sometimes stick to clothing. Please cover as needed to prevent irritation. Do not take a bath or submerge

## 2024-12-17 NOTE — PLAN OF CARE
Problem: Discharge Planning  Goal: Discharge to home or other facility with appropriate resources  12/17/2024 0029 by Rosaura Alan, RN  Outcome: Progressing  12/16/2024 1845 by Bijan Vernon RN  Outcome: Progressing     Problem: Pain  Goal: Verbalizes/displays adequate comfort level or baseline comfort level  12/17/2024 0029 by Rosaura Alan, RN  Outcome: Progressing  12/16/2024 1845 by Bijan Vernon RN  Outcome: Progressing     Problem: ABCDS Injury Assessment  Goal: Absence of physical injury  12/17/2024 0029 by Rosaura Alan, RN  Outcome: Progressing  12/16/2024 1845 by Bijan Vernon RN  Outcome: Progressing     Problem: Safety - Adult  Goal: Free from fall injury  12/17/2024 0029 by Rosaura Alan, RN  Outcome: Progressing  12/16/2024 1845 by Bijan Vernon RN  Outcome: Progressing     
  Problem: Discharge Planning  Goal: Discharge to home or other facility with appropriate resources  12/17/2024 1002 by Bijan Vernon RN  Outcome: Progressing  12/17/2024 0029 by Rosaura Alan RN  Outcome: Progressing     Problem: Pain  Goal: Verbalizes/displays adequate comfort level or baseline comfort level  12/17/2024 1002 by Bijan Vernon RN  Outcome: Progressing  12/17/2024 0029 by Rosaura Alan, RN  Outcome: Progressing     Problem: ABCDS Injury Assessment  Goal: Absence of physical injury  12/17/2024 1002 by Bijan Vernon RN  Outcome: Progressing  12/17/2024 0029 by Rosaura Alan RN  Outcome: Progressing     Problem: Safety - Adult  Goal: Free from fall injury  12/17/2024 1002 by Bijan Vernon RN  Outcome: Progressing  12/17/2024 0029 by Rosaura Alan, RN  Outcome: Progressing     
  Problem: Discharge Planning  Goal: Discharge to home or other facility with appropriate resources  Outcome: Progressing     Problem: Pain  Goal: Verbalizes/displays adequate comfort level or baseline comfort level  Outcome: Progressing     Problem: ABCDS Injury Assessment  Goal: Absence of physical injury  Outcome: Progressing     Problem: Safety - Adult  Goal: Free from fall injury  Outcome: Progressing     
1.89

## 2024-12-17 NOTE — CARE COORDINATION
Met with patient about diagnosis and discharge plan of care. POD#1 right revision total knee arthroplasty. Pt lives alone in 1 floor [place], 3 steps/rails in. Ordered wheeled walker and 3-1 commode through [facility]. Pt has walk in shower with chair. Pt son is going to stay with patient. Plan is home with [facility]-orders completed. PCP is Dr Jiménez. Plan for discharge today-[facility] [place]

## 2024-12-17 NOTE — CONSULTS
earlier  Neurologic:5/5 muscle strength throughout, normal muscle tone throughout, face symmetric, hearing intact, tongue midline, speech appropriate without slurring, sensation to fine touch intact in upper and lower extremities    Labs-   Lab Results   Component Value Date    WBC 9.7 12/17/2024    HGB 11.4 (L) 12/17/2024    HCT 35.8 12/17/2024     12/17/2024     12/17/2024    K 4.4 12/17/2024     12/17/2024    CREATININE 0.9 12/17/2024    BUN 11 12/17/2024    CO2 25 12/17/2024    GLUCOSE 134 (H) 12/17/2024    ALT 31 04/10/2019    AST 30 04/10/2019     Lab Results   Component Value Date    CKMB 1.5 09/16/2014    TROPONINI <0.01 09/16/2014       Recent Radiological Studies:  FLUORO FOR SURGICAL PROCEDURES    Result Date: 12/16/2024  EXAMINATION: SPOT FLUOROSCOPIC IMAGES; RIGHT KNEE FOUR VIEWS 12/16/2024 4:15 pm TECHNIQUE: Fluoroscopy was provided by the radiology department for procedure. Radiologist was not present during examination. FLUOROSCOPY DOSE AND TYPE: Radiation Exposure Index: Kerma mGy, 0.67 mGy.  Total fluoroscopic time: 11.3 seconds.  Total number of images: 3 COMPARISON: None HISTORY: ORDERING SYSTEM PROVIDED HISTORY: REVISION RIGHT TOTAL KNEE ARTHROPLASTY TECHNOLOGIST PROVIDED HISTORY: Reason for exam:->REVISION RIGHT TOTAL KNEE ARTHROPLASTY Intraprocedural imaging. FINDINGS: FLUOROSCOPY: Spot intraoperative images are obtained demonstrating metallic ruby in the proximal tibia without evidence of complications.  Postop changes in the soft tissues medially. RIGHT KNEE: Images demonstrate recent postoperative appearance of the knee joint with long stem tibial and femoral components.  There is normal alignment.  Postop changes are present in the soft tissues.  No acute fracture.  Patella appears to be in normal position.     1. Intraprocedural fluoroscopic spot images as above. See separate procedure report for more information. 2. Long-stem femoral and tibial component of knee joint

## 2024-12-17 NOTE — ACP (ADVANCE CARE PLANNING)
Advance Care Planning   Healthcare Decision Maker:    Primary Decision Maker: Francisco Vitale - Child - [phone]    Click here to complete Healthcare Decision Makers including selection of the Healthcare Decision Maker Relationship (ie \"Primary\").

## 2024-12-19 LAB — SURGICAL PATHOLOGY REPORT: NORMAL

## 2024-12-21 LAB
MICROORGANISM SPEC CULT: NORMAL
MICROORGANISM/AGENT SPEC: NORMAL
SERVICE CMNT-IMP: NORMAL
SPECIMEN DESCRIPTION: NORMAL

## 2024-12-22 LAB
MICROORGANISM SPEC CULT: NORMAL
SERVICE CMNT-IMP: NORMAL
SPECIMEN DESCRIPTION: NORMAL

## 2025-06-20 ENCOUNTER — OFFICE VISIT (OUTPATIENT)
Dept: CARDIOLOGY CLINIC | Age: 75
End: 2025-06-20
Payer: MEDICARE

## 2025-06-20 VITALS
SYSTOLIC BLOOD PRESSURE: 130 MMHG | OXYGEN SATURATION: 98 % | HEART RATE: 79 BPM | RESPIRATION RATE: 14 BRPM | TEMPERATURE: 97.7 F | BODY MASS INDEX: 25.83 KG/M2 | DIASTOLIC BLOOD PRESSURE: 76 MMHG | HEIGHT: 65 IN | WEIGHT: 155 LBS

## 2025-06-20 DIAGNOSIS — I10 PRIMARY HYPERTENSION: ICD-10-CM

## 2025-06-20 DIAGNOSIS — I25.810 CORONARY ARTERY DISEASE INVOLVING CORONARY BYPASS GRAFT OF NATIVE HEART WITHOUT ANGINA PECTORIS: Primary | ICD-10-CM

## 2025-06-20 DIAGNOSIS — Z78.9 STATIN INTOLERANCE: ICD-10-CM

## 2025-06-20 DIAGNOSIS — E78.2 MIXED HYPERLIPIDEMIA: ICD-10-CM

## 2025-06-20 PROCEDURE — 93000 ELECTROCARDIOGRAM COMPLETE: CPT | Performed by: INTERNAL MEDICINE

## 2025-06-20 PROCEDURE — 99214 OFFICE O/P EST MOD 30 MIN: CPT | Performed by: INTERNAL MEDICINE

## 2025-06-20 PROCEDURE — 3075F SYST BP GE 130 - 139MM HG: CPT | Performed by: INTERNAL MEDICINE

## 2025-06-20 PROCEDURE — 1123F ACP DISCUSS/DSCN MKR DOCD: CPT | Performed by: INTERNAL MEDICINE

## 2025-06-20 PROCEDURE — 3078F DIAST BP <80 MM HG: CPT | Performed by: INTERNAL MEDICINE

## 2025-06-20 PROCEDURE — 1159F MED LIST DOCD IN RCRD: CPT | Performed by: INTERNAL MEDICINE

## 2025-06-20 NOTE — PROGRESS NOTES
calculated to be 91%, with normal myocardial thickening and wall motion.  Impression:    Exercise EKG was negative.  The patient experienced no chest pain with exercise.   The myocardial perfusion imaging was normal.    Overall left ventricular systolic function was normal without regional wall motion abnormalities.  Tracey treadmill score was 6.5 implying low risk.    Exercise capacity was average.    Low risk general exercise treadmill test.    ASSESSMENT / PLAN:  1. CAD s/p 5V CABG in 2001 with subsequent PCI ~ 6 months later. Negative stress test in 2007. Small mild reversible defect at the basal inferior wall on 3/2014 stress test (medically managed, normal LV function with EF 60% on echocardiogram at that time). Previously followed by Dr. Pantoja. +recent fatigue and intermittent left arm pain.  2. HTN - typically controlled, most recent 's-130's  3. HLD - on zetia, reported statin intolerance (multiple statins)  4. Chronic back pain  5. Mild MR and mild TR on 3/2014 echocardiogram  - 2/2015 labs: K 4.3, Cr 0.87, normal LFT's, chol 202, trig 70, LDL 92, HDL 86  - 5/2017 labs: K 4.4, Cr 0.87, normal LFT's, chol 292, trig 232, , HDL 60  - 4/2019 labs: chol 245, trig 141, , HDL 70  - 11/12/24 labs: chol 331, HDL 76, trig 239,   6. COVID-19 infection in 12/2021  7. Palpitations  8. S/p revision right knee replacement in 12/2024    - Results of 9/2019 exercise nuclear stress test reviewed with the patient (pending clinical course, options for additional CAD work-up reviewed today -- no recent chest pain)  - Discussed option of cardiac monitoring (palpitations improved recently)  - Continue current medications (authorization for repatha pending)  - Monitor lipid panel (repeat labs scheduled for 7/2025)    Greater than 30 minutes was spent counseling the patient, reviewing the rationale for the above recommendations and reviewing the patient's current medication list, problem list and results

## (undated) DEVICE — 3M™ IOBAN™ 2 ANTIMICROBIAL INCISE DRAPE 6650EZ: Brand: IOBAN™ 2

## (undated) DEVICE — SOLUTION IV 1000ML 0.9% SOD CHL PH 5 INJ USP VIAFLX PLAS

## (undated) DEVICE — SOLUTION IRRIG 2000ML 0.9% SOD CHL USP UROMATIC PLAS CONT

## (undated) DEVICE — 4-PORT MANIFOLD: Brand: NEPTUNE 2

## (undated) DEVICE — GLOVE ORTHO 7 1/2   MSG9475

## (undated) DEVICE — RECIPROCATING BLADE HEAVY DUTY, OFFSET  (77.5 X 1.23 X 11.0MM)

## (undated) DEVICE — ALCOHOL RUBBING ISO 16OZ 70%

## (undated) DEVICE — TRAP,MUCUS SPECIMEN,40CC: Brand: MEDLINE

## (undated) DEVICE — Device: Brand: STABLECUT®

## (undated) DEVICE — BANDAGE COMPR W6INXL12FT SMOOTH FOR LIMB EXSANG ESMARCH

## (undated) DEVICE — NEEDLE SPNL L3.5IN PNK HUB S STL REG WALL FIT STYL W/ QNCKE

## (undated) DEVICE — DRESSING HYDROFIBER AQUACEL AG ADVANTAGE 3.5X12 IN

## (undated) DEVICE — Y-TYPE TUR/BLADDER IRRIGATION SET, REGULATING CLAMP

## (undated) DEVICE — BOOT POS LEG DEMAYO

## (undated) DEVICE — GLOVE SURG SZ 8 L12IN FNGR THK79MIL GRN LTX FREE

## (undated) DEVICE — SPONGE LAP W18XL18IN WHT COT 4 PLY FLD STRUNG RADPQ DISP ST 2 PER PACK

## (undated) DEVICE — SOLUTION IRRIG 1000ML 0.9% SOD CHL USP POUR PLAS BTL

## (undated) DEVICE — PEEL-AWAY HOOD: Brand: FLYTE, SURGICOOL

## (undated) DEVICE — DOUBLE BASIN SET: Brand: MEDLINE INDUSTRIES, INC.

## (undated) DEVICE — ELECTRODE PT RET AD L9FT HI MOIST COND ADH HYDRGEL CORDED

## (undated) DEVICE — SYRINGE 20ML LL S/C 50

## (undated) DEVICE — DRAPE,REIN 53X77,STERILE: Brand: MEDLINE

## (undated) DEVICE — APPLICATOR MEDICATED 26 CC SOLUTION HI LT ORNG CHLORAPREP

## (undated) DEVICE — DRAPE,TOP,102X53,STERILE: Brand: MEDLINE

## (undated) DEVICE — NEEDLE HYPO 23GA L1.5IN TURQ POLYPR HUB S STL THN WALL IM

## (undated) DEVICE — APPLICATOR MEDICATED 3 CC SOLUTION HI LT ORNG CHLORAPREP 930415

## (undated) DEVICE — TUBING, SUCTION, 9/32" X 10', STRAIGHT: Brand: MEDLINE

## (undated) DEVICE — SUTURE MONOCRYL ABSORBABLE 3-0 PS-1 18 IN UD MONOCRYL + STRATAFIX SXMP1B102

## (undated) DEVICE — SYRINGE MED 50ML LUERLOCK TIP

## (undated) DEVICE — Device

## (undated) DEVICE — RECIPROCATING BLADE, DOUBLE SIDED, OFFSET  (70.0 X 0.64 X 12.6MM)

## (undated) DEVICE — BNDG,ELSTC,MATRIX,STRL,4"X5YD,LF,HOOK&LP: Brand: MEDLINE

## (undated) DEVICE — BASIC SINGLE BASIN 1-LF: Brand: MEDLINE INDUSTRIES, INC.

## (undated) DEVICE — MARKER,SKIN,WI/RULER AND LABELS: Brand: MEDLINE

## (undated) DEVICE — DRAPE C ARM W41XL74IN UNIV MOB W RUBBERBAND CLP

## (undated) DEVICE — SOLUTION WND IRRIGATION 450 ML 0.5 PVP-I 0.9 NACL

## (undated) DEVICE — 3M™ STERI-DRAPE™ U-DRAPE 1015: Brand: STERI-DRAPE™

## (undated) DEVICE — STRIP,CLOSURE,WOUND,MEDI-STRIP,1/2X4: Brand: MEDLINE

## (undated) DEVICE — 3M™ COBAN™ NL STERILE NON-LATEX SELF-ADHERENT WRAP, 2084S, 4 IN X 5 YD (10 CM X 4,5 M), 18 ROLLS/CASE: Brand: 3M™ COBAN™

## (undated) DEVICE — GOWN,SIRUS,POLYRNF,BRTHSLV,XL,30/CS: Brand: MEDLINE

## (undated) DEVICE — HANDPIECE SET WITH COAXIAL HIGH FLOW TIP AND SUCTION TUBE: Brand: INTERPULSE

## (undated) DEVICE — PADDING,UNDERCAST,COTTON, 4"X4YD STERILE: Brand: MEDLINE

## (undated) DEVICE — KIT SURG W7XL11IN 2 PKT UNTREATED NA

## (undated) DEVICE — BNDG,ELSTC,MATRIX,STRL,6"X5YD,LF,HOOK&LP: Brand: MEDLINE

## (undated) DEVICE — SOLUTION IRRIG 1000ML STRL H2O USP PLAS POUR BTL

## (undated) DEVICE — SKN PREP SPNG STKS PVP PNT STR: Brand: MEDLINE INDUSTRIES, INC.

## (undated) DEVICE — PREVENA PLUS INCISION MANAGEMENT SYSTEM: Brand: PREVENA PLUS™

## (undated) DEVICE — SPONGE,DRAIN,NONWVN,4"X4",6PLY,STRL,LF: Brand: MEDLINE

## (undated) DEVICE — TOWEL,OR,DSP,ST,BLUE,STD,6/PK,12PK/CS: Brand: MEDLINE

## (undated) DEVICE — LIQUIBAND RAPID ADHESIVE 36/CS 0.8ML: Brand: MEDLINE

## (undated) DEVICE — SYSTEM VAC MIX SGL DBL CLEARMIX 10 PER CA

## (undated) DEVICE — SUTURE STRATAFIX SYMMETRIC PDS + SZ 1 L18IN ABSRB VLT L48MM SXPP1A400